# Patient Record
Sex: FEMALE | Race: WHITE | NOT HISPANIC OR LATINO | Employment: OTHER | URBAN - METROPOLITAN AREA
[De-identification: names, ages, dates, MRNs, and addresses within clinical notes are randomized per-mention and may not be internally consistent; named-entity substitution may affect disease eponyms.]

---

## 2017-09-01 ENCOUNTER — HOSPITAL ENCOUNTER (OUTPATIENT)
Dept: LAB | Facility: HOSPITAL | Age: 62
Discharge: HOME | End: 2017-09-01
Attending: OBSTETRICS & GYNECOLOGY | Admitting: OBSTETRICS & GYNECOLOGY

## 2017-09-05 ENCOUNTER — HOSPITAL ENCOUNTER (OUTPATIENT)
Dept: OTHER | Facility: HOSPITAL | Age: 62
Setting detail: SPECIMEN
Discharge: HOME | End: 2017-09-05
Attending: OBSTETRICS & GYNECOLOGY | Admitting: OBSTETRICS & GYNECOLOGY

## 2017-10-31 ENCOUNTER — HOSPITAL ENCOUNTER (OUTPATIENT)
Dept: LAB | Facility: HOSPITAL | Age: 62
Discharge: HOME | End: 2017-10-31
Attending: OBSTETRICS & GYNECOLOGY | Admitting: OBSTETRICS & GYNECOLOGY

## 2017-10-31 LAB
ABO + RH BLD: NORMAL
ARMBAND: NORMAL
BASOPHILS # BLD AUTO: 0 10*3/UL (ref 0–0.2)
BASOPHILS NFR BLD AUTO: 1 % (ref 0–2)
BLD COMPONENT TYPE: NORMAL
BLD GP AB SCN SERPL QL: NEGATIVE
CROSSMATCH EXPIRATION: NORMAL
DIFFERENTIAL METHOD BLD: (no result)
EOSINOPHIL # BLD AUTO: 0 10*3/UL (ref 0–0.3)
EOSINOPHIL # BLD AUTO: 1 % (ref 0–3)
ERYTHROCYTE [DISTWIDTH] IN BLOOD BY AUTOMATED COUNT: 13 % (ref 11.5–14.5)
HCT VFR BLD AUTO: 35.5 % (ref 35–49)
HGB BLD-MCNC: 11.9 G/DL (ref 12–15)
LYMPHOCYTES # BLD AUTO: 1.7 10*3/UL (ref 0.8–4.8)
LYMPHOCYTES NFR BLD AUTO: 45 % (ref 18–42)
MCH RBC QN AUTO: 30.6 PG (ref 26–32)
MCHC RBC AUTO-ENTMCNC: 33.6 G/DL (ref 32–36)
MCV RBC AUTO: 90.9 FL (ref 80–94)
MONOCYTES # BLD AUTO: 0.4 10*3/UL (ref 0.1–1.3)
MONOCYTES NFR BLD AUTO: 10 % (ref 2–11)
NEUTROPHILS # BLD AUTO: 1.6 10*3/UL (ref 2.3–8.6)
NEUTROPHILS NFR BLD AUTO: 43 % (ref 50–75)
NRBC BLD AUTO-RTO: 0 /100{WBCS}
NRBC/RBC NFR BLD MANUAL: 0 10*3/UL
PLATELET # BLD AUTO: 190 10*3/UL (ref 150–450)
PMV BLD AUTO: 9.5 FL (ref 7.4–10.4)
RBC # BLD AUTO: 3.9 10*6/UL (ref 4–5.4)
WBC # BLD AUTO: 3.8 10*3/UL (ref 4.5–11.5)

## 2017-11-02 ENCOUNTER — HOSPITAL ENCOUNTER (OUTPATIENT)
Dept: OTHER | Facility: HOSPITAL | Age: 62
Setting detail: SPECIMEN
Discharge: HOME | End: 2017-11-02
Attending: OBSTETRICS & GYNECOLOGY | Admitting: OBSTETRICS & GYNECOLOGY

## 2019-04-26 ENCOUNTER — HOSPITAL ENCOUNTER (OUTPATIENT)
Dept: FAMILY MEDICINE CLINIC | Facility: CLINIC | Age: 64
Setting detail: SPECIMEN
Discharge: HOME | End: 2019-04-26
Attending: FAMILY MEDICINE | Admitting: FAMILY MEDICINE

## 2019-04-26 LAB
ALBUMIN SERPL-MCNC: 3.6 G/DL (ref 3.5–4.8)
ALBUMIN/GLOB SERPL: 1.1 {RATIO} (ref 1–1.7)
ALP SERPL-CCNC: 61 IU/L (ref 32–91)
ALT SERPL-CCNC: 16 IU/L (ref 14–54)
ANION GAP SERPL CALC-SCNC: 13.6 MMOL/L (ref 10–20)
AST SERPL-CCNC: 25 IU/L (ref 15–41)
BASOPHILS # BLD AUTO: 0 10*3/UL (ref 0–0.2)
BASOPHILS NFR BLD AUTO: 0 % (ref 0–2)
BILIRUB SERPL-MCNC: 0.2 MG/DL (ref 0.3–1.2)
BUN SERPL-MCNC: 12 MG/DL (ref 8–20)
BUN/CREAT SERPL: 17.1 (ref 5.4–26.2)
CALCIUM SERPL-MCNC: 9 MG/DL (ref 8.9–10.3)
CHLORIDE SERPL-SCNC: 103 MMOL/L (ref 101–111)
CHOLEST SERPL-MCNC: 221 MG/DL
CHOLEST/HDLC SERPL: 2.9 {RATIO}
CONV CO2: 25 MMOL/L (ref 22–32)
CONV LDL CHOLESTEROL DIRECT: 130 MG/DL (ref 0–100)
CONV TOTAL PROTEIN: 6.9 G/DL (ref 6.1–7.9)
CREAT UR-MCNC: 0.7 MG/DL (ref 0.4–1)
DIFFERENTIAL METHOD BLD: (no result)
EOSINOPHIL # BLD AUTO: 0.1 10*3/UL (ref 0–0.3)
EOSINOPHIL # BLD AUTO: 1 % (ref 0–3)
ERYTHROCYTE [DISTWIDTH] IN BLOOD BY AUTOMATED COUNT: 13 % (ref 11.5–14.5)
GLOBULIN UR ELPH-MCNC: 3.3 G/DL (ref 2.5–3.8)
GLUCOSE SERPL-MCNC: 95 MG/DL (ref 65–99)
HCT VFR BLD AUTO: 40.8 % (ref 35–49)
HDLC SERPL-MCNC: 76 MG/DL
HGB BLD-MCNC: 13.6 G/DL (ref 12–15)
LDLC/HDLC SERPL: 1.7 {RATIO}
LIPID INTERPRETATION: ABNORMAL
LYMPHOCYTES # BLD AUTO: 1.6 10*3/UL (ref 0.8–4.8)
LYMPHOCYTES NFR BLD AUTO: 41 % (ref 18–42)
MCH RBC QN AUTO: 30.8 PG (ref 26–32)
MCHC RBC AUTO-ENTMCNC: 33.4 G/DL (ref 32–36)
MCV RBC AUTO: 92.2 FL (ref 80–94)
MONOCYTES # BLD AUTO: 0.3 10*3/UL (ref 0.1–1.3)
MONOCYTES NFR BLD AUTO: 8 % (ref 2–11)
NEUTROPHILS # BLD AUTO: 1.9 10*3/UL (ref 2.3–8.6)
NEUTROPHILS NFR BLD AUTO: 50 % (ref 50–75)
NRBC BLD AUTO-RTO: 0 /100{WBCS}
NRBC/RBC NFR BLD MANUAL: 0 10*3/UL
PLATELET # BLD AUTO: 217 10*3/UL (ref 150–450)
PMV BLD AUTO: 9.9 FL (ref 7.4–10.4)
POTASSIUM SERPL-SCNC: 4.6 MMOL/L (ref 3.6–5.1)
RBC # BLD AUTO: 4.43 10*6/UL (ref 4–5.4)
SODIUM SERPL-SCNC: 137 MMOL/L (ref 136–144)
TRIGL SERPL-MCNC: 127 MG/DL
VLDLC SERPL CALC-MCNC: 15.2 MG/DL
WBC # BLD AUTO: 3.8 10*3/UL (ref 4.5–11.5)

## 2019-06-25 ENCOUNTER — LAB (OUTPATIENT)
Dept: LAB | Facility: HOSPITAL | Age: 64
End: 2019-06-25

## 2019-06-25 ENCOUNTER — CONSULT (OUTPATIENT)
Dept: ONCOLOGY | Facility: CLINIC | Age: 64
End: 2019-06-25

## 2019-06-25 VITALS
HEART RATE: 69 BPM | BODY MASS INDEX: 22.63 KG/M2 | DIASTOLIC BLOOD PRESSURE: 63 MMHG | SYSTOLIC BLOOD PRESSURE: 129 MMHG | RESPIRATION RATE: 18 BRPM | WEIGHT: 140.8 LBS | TEMPERATURE: 97.8 F | HEIGHT: 66 IN

## 2019-06-25 DIAGNOSIS — D70.8 OTHER NEUTROPENIA (HCC): ICD-10-CM

## 2019-06-25 DIAGNOSIS — D70.8 OTHER NEUTROPENIA (HCC): Primary | ICD-10-CM

## 2019-06-25 LAB
FERRITIN SERPL-MCNC: 18 NG/ML (ref 11–307)
FOLATE SERPL-MCNC: 18.1 NG/ML (ref 5.9–24.8)
LDH SERPL-CCNC: 109 U/L (ref 98–192)
PATHOLOGY REVIEW: YES
RETICS # AUTO: 0.02 10*6/MM3 (ref 0.02–0.13)
RETICS/RBC NFR AUTO: 0.6 % (ref 0.7–1.9)
VIT B12 BLD-MCNC: 1201 PG/ML (ref 180–914)

## 2019-06-25 PROCEDURE — 83615 LACTATE (LD) (LDH) ENZYME: CPT | Performed by: INTERNAL MEDICINE

## 2019-06-25 PROCEDURE — 36415 COLL VENOUS BLD VENIPUNCTURE: CPT

## 2019-06-25 PROCEDURE — 82607 VITAMIN B-12: CPT | Performed by: INTERNAL MEDICINE

## 2019-06-25 PROCEDURE — G0463 HOSPITAL OUTPT CLINIC VISIT: HCPCS | Performed by: INTERNAL MEDICINE

## 2019-06-25 PROCEDURE — 83010 ASSAY OF HAPTOGLOBIN QUANT: CPT | Performed by: INTERNAL MEDICINE

## 2019-06-25 PROCEDURE — 85045 AUTOMATED RETICULOCYTE COUNT: CPT | Performed by: INTERNAL MEDICINE

## 2019-06-25 PROCEDURE — 99204 OFFICE O/P NEW MOD 45 MIN: CPT | Performed by: INTERNAL MEDICINE

## 2019-06-25 PROCEDURE — 82746 ASSAY OF FOLIC ACID SERUM: CPT | Performed by: INTERNAL MEDICINE

## 2019-06-25 PROCEDURE — 82728 ASSAY OF FERRITIN: CPT | Performed by: INTERNAL MEDICINE

## 2019-06-25 RX ORDER — CHOLECALCIFEROL (VITAMIN D3) 125 MCG
1 CAPSULE ORAL DAILY
COMMUNITY

## 2019-06-25 RX ORDER — CETIRIZINE HYDROCHLORIDE 10 MG/1
10 TABLET ORAL DAILY
COMMUNITY

## 2019-06-25 RX ORDER — DIPHENHYDRAMINE HCL 25 MG
12.5 TABLET ORAL NIGHTLY PRN
COMMUNITY
End: 2021-07-27

## 2019-06-25 NOTE — PROGRESS NOTES
Hematology/Oncology Outpatient Consultation    Kym Yin  1955    Primary Care Physician: Marcelle Martin DO  Referring Physician: Marcelle Martin DO  Reason For Consult:     Chief Complaint   Patient presents with   • Appointment     New patient with Leukopenia       History of Present Illness:    Past Medical History:   Diagnosis Date   • Arthritis    • Endometriosis    • Environmental allergies    • Hard of hearing        Past Surgical History:   Procedure Laterality Date   • BREAST BIOPSY      benign   • CATARACT EXTRACTION, BILATERAL     • CYST REMOVAL      Behind the knee   • HYSTERECTOMY  2017    Total due to endometriosis   • INGUINAL HERNIA REPAIR           Current Outpatient Medications:   •  cetirizine (zyrTEC) 10 MG tablet, Take 10 mg by mouth Daily., Disp: , Rfl:   •  Cholecalciferol (VITAMIN D3) 2000 units tablet, Take 1 tablet by mouth Daily., Disp: , Rfl:   •  diphenhydrAMINE (BENADRYL) 25 MG tablet, Take 12.5 mg by mouth At Night As Needed for Itching., Disp: , Rfl:   •  Unable to find, Take 1 each by mouth Daily. Triphala, Disp: , Rfl:   •  Unable to find, Take 1 each by mouth Daily. Pueraria Mirifica, Disp: , Rfl:     No Known Allergies      There is no immunization history on file for this patient.    Family History   Problem Relation Age of Onset   • Prostate cancer Father 62   • Breast cancer Sister 56   • Cancer Sister 62        Neck   • Breast cancer Maternal Grandmother 52       Cancer-related family history includes Breast cancer (age of onset: 52) in her maternal grandmother; Breast cancer (age of onset: 56) in her sister; Cancer (age of onset: 62) in her sister; Prostate cancer (age of onset: 62) in her father.    Social History     Tobacco Use   • Smoking status: Former Smoker     Packs/day: 1.00     Years: 12.00     Pack years: 12.00     Start date:      Last attempt to quit:      Years since quittin.5   • Smokeless tobacco: Never Used   Substance Use Topics  "  • Alcohol use: Yes     Comment: occasionally   • Drug use: No       ROS:    Review of Systems    Objective:    Vitals:    06/25/19 1026   BP: 129/63   Pulse: 69   Resp: 18   Temp: 97.8 °F (36.6 °C)   Weight: 63.9 kg (140 lb 12.8 oz)   Height: 166.4 cm (65.5\")   PainSc: 0-No pain       ECOG  {[facility] ECOG Status:70956}    Physical Exam:    Physical Exam    RECENT LABS  WBC   Date Value Ref Range Status   04/26/2019 3.8 (L) 4.5 - 11.5 10*3/uL Final     RBC   Date Value Ref Range Status   04/26/2019 4.43 4.00 - 5.40 10*6/uL Final     Hemoglobin   Date Value Ref Range Status   04/26/2019 13.6 12.0 - 15.0 g/dL Final     Hematocrit   Date Value Ref Range Status   04/26/2019 40.8 35 - 49 % Final     MCV   Date Value Ref Range Status   04/26/2019 92.2 80 - 94 fL Final     MCH   Date Value Ref Range Status   04/26/2019 30.8 26 - 32 pg Final     MCHC   Date Value Ref Range Status   04/26/2019 33.4 32 - 36 g/dL Final     RDW   Date Value Ref Range Status   04/26/2019 13.0 11.5 - 14.5 % Final     MPV   Date Value Ref Range Status   04/26/2019 9.9 7.4 - 10.4 fL Final     Platelets   Date Value Ref Range Status   04/26/2019 217 150 - 450 10*3/uL Final     Neutrophil Rel %   Date Value Ref Range Status   04/26/2019 50 50 - 75 % Final     Lymphocyte Rel %   Date Value Ref Range Status   04/26/2019 41 18 - 42 % Final     Monocyte Rel %   Date Value Ref Range Status   04/26/2019 8 2 - 11 % Final     Eosinophil Rel %   Date Value Ref Range Status   04/26/2019 1 0 - 3 % Final     Basophil Rel %   Date Value Ref Range Status   04/26/2019 0 0 - 2 % Final     Neutrophils Absolute   Date Value Ref Range Status   04/26/2019 1.9 (L) 2.3 - 8.6 10*3/uL Final     Lymphocytes Absolute   Date Value Ref Range Status   04/26/2019 1.6 0.8 - 4.8 10*3/uL Final     Monocytes Absolute   Date Value Ref Range Status   04/26/2019 0.3 0.1 - 1.3 10*3/uL Final     Eosinophils Absolute   Date Value Ref Range Status   04/26/2019 0.1 0.0 - 0.3 " 10*3/uL Final     Basophils Absolute   Date Value Ref Range Status   04/26/2019 0.0 0 - 0.2 10*3/uL Final     nRBC   Date Value Ref Range Status   04/26/2019 0 0 /100[WBCs] Final       Lab Results   Component Value Date    GLUCOSE 95 04/26/2019    BUN 12 04/26/2019    CREATININE 0.7 04/26/2019    BCR 17.1 04/26/2019    K 4.6 04/26/2019    CO2 25 04/26/2019    CALCIUM 9.0 04/26/2019    ALBUMIN 3.6 04/26/2019    LABIL2 1.1 04/26/2019    AST 25 04/26/2019    ALT 16 04/26/2019         Assessment/Plan     No diagnosis found.    I have reviewed labs results, imaging, vitals, and medications with the patient today.  Will follow up in *** months with ***.         Patient verbalized understanding and is in agreement of the above plan.              Part of this document was scribed by Araceli Adorno RN, BSN.

## 2019-06-25 NOTE — PROGRESS NOTES
Hematology/Oncology Outpatient Consultation    Kym Yin  1955    Primary Care Physician: Marcelle Martin DO  Referring Physician: Marcelle Martin DO  Reason For Consult:     Chief Complaint   Patient presents with   • Appointment     New patient with Leukopenia       History of Present Illness:  This is a 64-year-old female who is essentially healthy but has had a history of leukopenia for several years.  Patient also states that she was a strict vegan for many years but over the past 2 years has become a vegetarian.  She says that her white count range anywhere from 2.1-3.5.  Review of her CBCs for the past yea has shown a white count of 3.8, hemoglobin 13.6 and platelet count of 217,000 as of May 2019.  Her differentials with ANC of 1.9 there was no lymphocytosis, monocytosis, basophilia or eosinophilia.  Patient feels well overall she remains active.  She denies any new pain issues or night sweats.  She has chronic fatigue which has not changed over the years she usually will take a one hour nap every day.    Past Medical History:   Diagnosis Date   • Arthritis    • Endometriosis    • Environmental allergies    • Hard of hearing        Past Surgical History:   Procedure Laterality Date   • BREAST BIOPSY      benign   • CATARACT EXTRACTION, BILATERAL     • CYST REMOVAL      Behind the knee   • HYSTERECTOMY  2017    Total due to endometriosis   • INGUINAL HERNIA REPAIR           Current Outpatient Medications:   •  cetirizine (zyrTEC) 10 MG tablet, Take 10 mg by mouth Daily., Disp: , Rfl:   •  Cholecalciferol (VITAMIN D3) 2000 units tablet, Take 1 tablet by mouth Daily., Disp: , Rfl:   •  diphenhydrAMINE (BENADRYL) 25 MG tablet, Take 12.5 mg by mouth At Night As Needed for Itching., Disp: , Rfl:   •  Unable to find, Take 1 each by mouth Daily. Triphala, Disp: , Rfl:   •  Unable to find, Take 1 each by mouth Daily. Pueraria Mirifica, Disp: , Rfl:     No Known Allergies      There is no immunization  "history on file for this patient.    Family History   Problem Relation Age of Onset   • Prostate cancer Father 62   • Breast cancer Sister 56   • Cancer Sister 62        Neck   • Breast cancer Maternal Grandmother 52       Cancer-related family history includes Breast cancer (age of onset: 52) in her maternal grandmother; Breast cancer (age of onset: 56) in her sister; Cancer (age of onset: 62) in her sister; Prostate cancer (age of onset: 62) in her father.    Social History     Tobacco Use   • Smoking status: Former Smoker     Packs/day: 1.00     Years: 12.00     Pack years: 12.00     Start date:      Last attempt to quit:      Years since quittin.5   • Smokeless tobacco: Never Used   Substance Use Topics   • Alcohol use: Yes     Comment: occasionally   • Drug use: No       ROS:    Review of Systems   Constitutional: Positive for fatigue. Negative for chills and fever.   HENT: Negative for ear pain, mouth sores, nosebleeds and sore throat.    Eyes: Negative for photophobia and visual disturbance.   Respiratory: Negative for wheezing and stridor.    Cardiovascular: Negative for chest pain and palpitations.   Gastrointestinal: Negative for abdominal pain, diarrhea, nausea and vomiting.   Endocrine: Negative for cold intolerance and heat intolerance.   Genitourinary: Negative for dysuria and hematuria.   Musculoskeletal: Negative for joint swelling and neck stiffness.   Skin: Negative for color change and rash.   Neurological: Negative for seizures and syncope.   Hematological: Negative for adenopathy.        No obvious bleeding   Psychiatric/Behavioral: Negative for agitation, confusion and hallucinations.       Objective:    Vitals:    19 1026   BP: 129/63   Pulse: 69   Resp: 18   Temp: 97.8 °F (36.6 °C)   Weight: 63.9 kg (140 lb 12.8 oz)   Height: 166.4 cm (65.5\")   PainSc: 0-No pain       ECOG  (0) Fully active, able to carry on all predisease performance without restriction    Physical " Exam:    Physical Exam   Constitutional: She is oriented to person, place, and time. No distress.   HENT:   Head: Normocephalic and atraumatic.   Eyes: Conjunctivae and EOM are normal. Right eye exhibits no discharge. No scleral icterus.   Neck: Normal range of motion. Neck supple. No thyromegaly present.   Cardiovascular: Normal rate, regular rhythm and normal heart sounds. Exam reveals no gallop and no friction rub.   Pulmonary/Chest: Effort normal. No stridor. No respiratory distress. She has no wheezes.   Abdominal: Soft. Bowel sounds are normal. She exhibits no mass. There is no tenderness. There is no rebound and no guarding.   Musculoskeletal: Normal range of motion. She exhibits no tenderness.   Lymphadenopathy:     She has no cervical adenopathy.   Neurological: She is alert and oriented to person, place, and time. She exhibits normal muscle tone.   Skin: Skin is warm. Rash noted. She is not diaphoretic. No erythema.   Rash on bilateral forearms   Psychiatric: She has a normal mood and affect. Her behavior is normal.   Nursing note and vitals reviewed.      RECENT LABS  WBC   Date Value Ref Range Status   04/26/2019 3.8 (L) 4.5 - 11.5 10*3/uL Final     RBC   Date Value Ref Range Status   04/26/2019 4.43 4.00 - 5.40 10*6/uL Final     Hemoglobin   Date Value Ref Range Status   04/26/2019 13.6 12.0 - 15.0 g/dL Final     Hematocrit   Date Value Ref Range Status   04/26/2019 40.8 35 - 49 % Final     MCV   Date Value Ref Range Status   04/26/2019 92.2 80 - 94 fL Final     MCH   Date Value Ref Range Status   04/26/2019 30.8 26 - 32 pg Final     MCHC   Date Value Ref Range Status   04/26/2019 33.4 32 - 36 g/dL Final     RDW   Date Value Ref Range Status   04/26/2019 13.0 11.5 - 14.5 % Final     MPV   Date Value Ref Range Status   04/26/2019 9.9 7.4 - 10.4 fL Final     Platelets   Date Value Ref Range Status   04/26/2019 217 150 - 450 10*3/uL Final     Neutrophil Rel %   Date Value Ref Range Status   04/26/2019 50  50 - 75 % Final     Lymphocyte Rel %   Date Value Ref Range Status   04/26/2019 41 18 - 42 % Final     Monocyte Rel %   Date Value Ref Range Status   04/26/2019 8 2 - 11 % Final     Eosinophil Rel %   Date Value Ref Range Status   04/26/2019 1 0 - 3 % Final     Basophil Rel %   Date Value Ref Range Status   04/26/2019 0 0 - 2 % Final     Neutrophils Absolute   Date Value Ref Range Status   04/26/2019 1.9 (L) 2.3 - 8.6 10*3/uL Final     Lymphocytes Absolute   Date Value Ref Range Status   04/26/2019 1.6 0.8 - 4.8 10*3/uL Final     Monocytes Absolute   Date Value Ref Range Status   04/26/2019 0.3 0.1 - 1.3 10*3/uL Final     Eosinophils Absolute   Date Value Ref Range Status   04/26/2019 0.1 0.0 - 0.3 10*3/uL Final     Basophils Absolute   Date Value Ref Range Status   04/26/2019 0.0 0 - 0.2 10*3/uL Final     nRBC   Date Value Ref Range Status   04/26/2019 0 0 /100[WBCs] Final       Lab Results   Component Value Date    GLUCOSE 95 04/26/2019    BUN 12 04/26/2019    CREATININE 0.7 04/26/2019    BCR 17.1 04/26/2019    K 4.6 04/26/2019    CO2 25 04/26/2019    CALCIUM 9.0 04/26/2019    ALBUMIN 3.6 04/26/2019    LABIL2 1.1 04/26/2019    AST 25 04/26/2019    ALT 16 04/26/2019         Assessment/Plan       1. Leukopenia  2. Family history of cancer patient has been given information on cancer genetics.  She will let me know how she wants to proceed in the future.      Plans  She has chronic leukopenia of unclear etiology. Possibilities include autoimmune disease, nutritional deficiencies, gammopathyor a primary bone marrow disease.  Her history and physical exam appears benign.  Labs have been ordered to further evaluate the above differential diagnosis including a peripheral smear    I will plan to see her back in 2 weeks to review the results of any further recommendations.  Also included in her lab is anti-neutrophil antibody test.      I have reviewed labs results, imaging, vitals, and medications with the patient today.         Patient verbalized understanding and is in agreement of the above plan. I have discussed the differential diagnosis with her.    Thank you very much for allowing me participate in the care of Ms. Yin. I will keep you updated on her progress.          Part of this document was scribed by Araceli Adorno RN, BSN.

## 2019-06-26 LAB
ALBUMIN SERPL-MCNC: 3.4 G/DL (ref 2.9–4.4)
ALBUMIN/GLOB SERPL: 1.1 {RATIO} (ref 0.7–1.7)
ALPHA1 GLOB FLD ELPH-MCNC: 0.3 G/DL (ref 0–0.4)
ALPHA2 GLOB SERPL ELPH-MCNC: 0.7 G/DL (ref 0.4–1)
ANA SER QL: NEGATIVE
B-GLOBULIN SERPL ELPH-MCNC: 1.1 G/DL (ref 0.7–1.3)
GAMMA GLOB SERPL ELPH-MCNC: 1.2 G/DL (ref 0.4–1.8)
GLOBULIN SER CALC-MCNC: 3.3 G/DL (ref 2.2–3.9)
HAPTOGLOB SERPL-MCNC: 94 MG/DL (ref 36–195)
IGA SERPL-MCNC: 180 MG/DL (ref 87–352)
IGG SERPL-MCNC: 1096 MG/DL (ref 700–1600)
IGM SERPL-MCNC: 99 MG/DL (ref 26–217)
INTERPRETATION SERPL IEP-IMP: NORMAL
Lab: NORMAL
M-SPIKE: NORMAL G/DL
PROT SERPL-MCNC: 6.7 G/DL (ref 6–8.5)

## 2019-06-27 LAB
LAB AP CASE REPORT: NORMAL
Lab: NORMAL
METHYLMALONATE SERPL-SCNC: 130 NMOL/L (ref 0–378)
PATH REPORT.FINAL DX SPEC: NORMAL

## 2019-07-09 ENCOUNTER — OFFICE VISIT (OUTPATIENT)
Dept: ONCOLOGY | Facility: CLINIC | Age: 64
End: 2019-07-09

## 2019-07-09 ENCOUNTER — APPOINTMENT (OUTPATIENT)
Dept: LAB | Facility: HOSPITAL | Age: 64
End: 2019-07-09

## 2019-07-09 VITALS
HEIGHT: 66 IN | RESPIRATION RATE: 18 BRPM | SYSTOLIC BLOOD PRESSURE: 105 MMHG | BODY MASS INDEX: 22.92 KG/M2 | WEIGHT: 142.6 LBS | TEMPERATURE: 97.4 F | DIASTOLIC BLOOD PRESSURE: 64 MMHG | HEART RATE: 73 BPM

## 2019-07-09 DIAGNOSIS — D70.8 OTHER NEUTROPENIA (HCC): Primary | ICD-10-CM

## 2019-07-09 LAB
BASOPHILS # BLD AUTO: 0.02 10*3/MM3 (ref 0–0.2)
BASOPHILS NFR BLD AUTO: 0.4 % (ref 0–1.5)
BILIRUB UR QL STRIP: ABNORMAL
CLARITY UR: CLEAR
COLOR UR: YELLOW
DEPRECATED RDW RBC AUTO: 38.1 FL (ref 37–54)
EOSINOPHIL # BLD AUTO: 0.02 10*3/MM3 (ref 0–0.4)
EOSINOPHIL NFR BLD AUTO: 0.4 % (ref 0.3–6.2)
ERYTHROCYTE [DISTWIDTH] IN BLOOD BY AUTOMATED COUNT: 11.9 % (ref 12.3–15.4)
GLUCOSE UR STRIP-MCNC: NEGATIVE MG/DL
HCT VFR BLD AUTO: 35.5 % (ref 34–46.6)
HGB BLD-MCNC: 12.1 G/DL (ref 12–15.9)
HGB UR QL STRIP.AUTO: NEGATIVE
KETONES UR QL STRIP: ABNORMAL
LEUKOCYTE ESTERASE UR QL STRIP.AUTO: NEGATIVE
LYMPHOCYTES # BLD AUTO: 1.57 10*3/MM3 (ref 0.7–3.1)
LYMPHOCYTES NFR BLD AUTO: 34.3 % (ref 19.6–45.3)
MCH RBC QN AUTO: 31.5 PG (ref 26.6–33)
MCHC RBC AUTO-ENTMCNC: 34.1 G/DL (ref 31.5–35.7)
MCV RBC AUTO: 92.4 FL (ref 79–97)
MONOCYTES # BLD AUTO: 0.35 10*3/MM3 (ref 0.1–0.9)
MONOCYTES NFR BLD AUTO: 7.6 % (ref 5–12)
NEUTROPHILS # BLD AUTO: 2.62 10*3/MM3 (ref 1.7–7)
NEUTROPHILS NFR BLD AUTO: 57.3 % (ref 42.7–76)
NITRITE UR QL STRIP: NEGATIVE
PH UR STRIP.AUTO: 5.5 [PH] (ref 5–8)
PLATELET # BLD AUTO: 192 10*3/MM3 (ref 140–450)
PMV BLD AUTO: 10.8 FL (ref 6–12)
PROT UR QL STRIP: NEGATIVE
RBC # BLD AUTO: 3.84 10*6/MM3 (ref 3.77–5.28)
SP GR UR STRIP: >=1.03 (ref 1–1.03)
UROBILINOGEN UR QL STRIP: ABNORMAL
WBC NRBC COR # BLD: 4.58 10*3/MM3 (ref 3.4–10.8)

## 2019-07-09 PROCEDURE — 85025 COMPLETE CBC W/AUTO DIFF WBC: CPT | Performed by: INTERNAL MEDICINE

## 2019-07-09 PROCEDURE — 99214 OFFICE O/P EST MOD 30 MIN: CPT | Performed by: INTERNAL MEDICINE

## 2019-07-09 PROCEDURE — 81003 URINALYSIS AUTO W/O SCOPE: CPT | Performed by: INTERNAL MEDICINE

## 2019-07-09 NOTE — PROGRESS NOTES
Hematology/Oncology Outpatient Follow Up    Patient name:Kym Yin  :1955  MRN: [id number]  Primary Care Physician: Marcelle Martin DO  Referring Physician: Marcelle Martin DO    Chief Complaint   Patient presents with   • Follow-up     leucopenia   • Follow-up     iron deficiency        History of Present Illness:     This is a 64-year-old female who is essentially healthy but has had a history of leukopenia for several years.  Patient also states that she was a strict vegan for many years but over the past 2 years has become a vegetarian.  She says that her white count range anywhere from 2.1-3.5.  Review of her CBCs for the past yea has shown a white count of 3.8, hemoglobin 13.6 and platelet count of 217,000 as of May 2019.  Her differentials with ANC of 1.9 there was no lymphocytosis, monocytosis, basophilia or eosinophilia.  Patient feels well overall she remains active.  She denies any new pain issues or night sweats.  She has chronic fatigue which has not changed over the years she usually will take a one hour nap every day.  · 2019 she had SPEP with DK which was negative.  Ferritin level was low at 18.  Folate was normal at 18.  Methylmalonic acid was normal at 130 B12 level was 1201.  Haptoglobin was normal at 94. SPEP with immunofixation assay was negative, reticulocyte count was 0.6.  Quantitative immunoglobulins for IgA was normal at 180, IgG was 1096 and IgM was 99 normal.  ЕЛЕНА was negative.  Peripheral smear did not show any morphologic abnormalities and her WBC is appeared adequate with normal differential and platelets were also adequate.      Past Medical History:   Diagnosis Date   • Arthritis    • Endometriosis    • Environmental allergies    • Hard of hearing        Past Surgical History:   Procedure Laterality Date   • BREAST BIOPSY      benign   • CATARACT EXTRACTION, BILATERAL     • CYST REMOVAL      Behind the knee   • HYSTERECTOMY  2017    Total due to dysfuntional  uterine bleeding   • INGUINAL HERNIA REPAIR           Current Outpatient Medications:   •  cetirizine (zyrTEC) 10 MG tablet, Take 10 mg by mouth Daily., Disp: , Rfl:   •  Cholecalciferol (VITAMIN D3) 2000 units tablet, Take 1 tablet by mouth Daily., Disp: , Rfl:   •  diphenhydrAMINE (BENADRYL) 25 MG tablet, Take 12.5 mg by mouth At Night As Needed for Itching., Disp: , Rfl:   •  Unable to find, Take 1 each by mouth Daily. Triphala, Disp: , Rfl:   •  Unable to find, Take 1 each by mouth Daily. Pueraria Mirifica, Disp: , Rfl:     Allergies   Allergen Reactions   • Latex Rash       Family History   Problem Relation Age of Onset   • Prostate cancer Father 62   • Breast cancer Sister 56   • Cancer Sister 62        Neck   • Breast cancer Maternal Grandmother 52   • Cancer Maternal Grandfather    • Breast cancer Paternal Cousin 40       Cancer-related family history includes Breast cancer (age of onset: 40) in her paternal cousin; Breast cancer (age of onset: 52) in her maternal grandmother; Breast cancer (age of onset: 56) in her sister; Cancer in her maternal grandfather; Cancer (age of onset: 62) in her sister; Prostate cancer (age of onset: 62) in her father.    Social History     Tobacco Use   • Smoking status: Former Smoker     Packs/day: 1.00     Years: 12.00     Pack years: 12.00     Start date:      Last attempt to quit:      Years since quittin.5   • Smokeless tobacco: Never Used   Substance Use Topics   • Alcohol use: Yes     Comment: occasionally   • Drug use: No       I have reviewed the history of present illness, past medical history, family history, social history, lab results, all notes and other records since the patient was last seen on 2019    SUBJECTIVE:  The patient is here for follow up.  He has no specific complaints.  She remains very active.  She denies any fevers, chills, rigors.  She is accompanied today by her spouse for this appointment.          ROS:  Review of Systems  "  Constitutional: Negative for chills and fever.   HENT: Negative for ear pain, mouth sores, nosebleeds and sore throat.    Eyes: Negative for photophobia and visual disturbance.   Respiratory: Negative for wheezing and stridor.    Cardiovascular: Negative for chest pain and palpitations.   Gastrointestinal: Negative for abdominal pain, diarrhea, nausea and vomiting.   Endocrine: Negative for cold intolerance and heat intolerance.   Genitourinary: Negative for dysuria and hematuria.   Musculoskeletal: Negative for joint swelling and neck stiffness.   Skin: Negative for color change and rash.   Neurological: Negative for seizures and syncope.   Hematological: Negative for adenopathy.        No obvious bleeding   Psychiatric/Behavioral: Negative for agitation, confusion and hallucinations.       Objective:    Vitals:    07/09/19 1356   BP: 105/64   Pulse: 73   Resp: 18   Temp: 97.4 °F (36.3 °C)   Weight: 64.7 kg (142 lb 9.6 oz)   Height: 166.4 cm (65.5\")   PainSc: 0-No pain       ECOG  (0) Fully active, able to carry on all predisease performance without restriction    Physical Exam   Constitutional: She is oriented to person, place, and time. No distress.   HENT:   Head: Normocephalic and atraumatic.   Eyes: Conjunctivae and EOM are normal. Right eye exhibits no discharge. Left eye exhibits no discharge. No scleral icterus.   Neck: Normal range of motion. Neck supple. No thyromegaly present.   Cardiovascular: Normal rate, regular rhythm and normal heart sounds. Exam reveals no gallop and no friction rub.   Pulmonary/Chest: Effort normal. No stridor. No respiratory distress. She has no wheezes.   Abdominal: Soft. Bowel sounds are normal. She exhibits no mass. There is no tenderness. There is no rebound and no guarding.   Musculoskeletal: Normal range of motion. She exhibits no tenderness.   Lymphadenopathy:     She has no cervical adenopathy.   Neurological: She is alert and oriented to person, place, and time. She " exhibits normal muscle tone.   Skin: Skin is warm. No rash noted. She is not diaphoretic. No erythema.   Psychiatric: She has a normal mood and affect. Her behavior is normal.   Nursing note and vitals reviewed.      RECENT LABS  WBC   Date Value Ref Range Status   07/09/2019 4.58 3.40 - 10.80 10*3/mm3 Final     RBC   Date Value Ref Range Status   07/09/2019 3.84 3.77 - 5.28 10*6/mm3 Final     Hemoglobin   Date Value Ref Range Status   07/09/2019 12.1 12.0 - 15.9 g/dL Final     Hematocrit   Date Value Ref Range Status   07/09/2019 35.5 34.0 - 46.6 % Final     MCV   Date Value Ref Range Status   07/09/2019 92.4 79.0 - 97.0 fL Final     MCH   Date Value Ref Range Status   07/09/2019 31.5 26.6 - 33.0 pg Final     MCHC   Date Value Ref Range Status   07/09/2019 34.1 31.5 - 35.7 g/dL Final     RDW   Date Value Ref Range Status   07/09/2019 11.9 (L) 12.3 - 15.4 % Final     RDW-SD   Date Value Ref Range Status   07/09/2019 38.1 37.0 - 54.0 fl Final     MPV   Date Value Ref Range Status   07/09/2019 10.8 6.0 - 12.0 fL Final     Platelets   Date Value Ref Range Status   07/09/2019 192 140 - 450 10*3/mm3 Final     Neutrophil %   Date Value Ref Range Status   07/09/2019 57.3 42.7 - 76.0 % Final     Lymphocyte %   Date Value Ref Range Status   07/09/2019 34.3 19.6 - 45.3 % Final     Monocyte %   Date Value Ref Range Status   07/09/2019 7.6 5.0 - 12.0 % Final     Eosinophil %   Date Value Ref Range Status   07/09/2019 0.4 0.3 - 6.2 % Final     Basophil %   Date Value Ref Range Status   07/09/2019 0.4 0.0 - 1.5 % Final     Neutrophils, Absolute   Date Value Ref Range Status   07/09/2019 2.62 1.70 - 7.00 10*3/mm3 Final     Lymphocytes, Absolute   Date Value Ref Range Status   07/09/2019 1.57 0.70 - 3.10 10*3/mm3 Final     Monocytes, Absolute   Date Value Ref Range Status   07/09/2019 0.35 0.10 - 0.90 10*3/mm3 Final     Eosinophils, Absolute   Date Value Ref Range Status   07/09/2019 0.02 0.00 - 0.40 10*3/mm3 Final     Basophils,  Absolute   Date Value Ref Range Status   07/09/2019 0.02 0.00 - 0.20 10*3/mm3 Final     nRBC   Date Value Ref Range Status   04/26/2019 0 0 /100[WBCs] Final       Lab Results   Component Value Date    GLUCOSE 95 04/26/2019    BUN 12 04/26/2019    CREATININE 0.7 04/26/2019    BCR 17.1 04/26/2019    K 4.6 04/26/2019    CO2 25 04/26/2019    CALCIUM 9.0 04/26/2019    PROTENTOTREF 6.7 06/25/2019    ALBUMIN 3.4 06/25/2019    LABIL2 1.1 06/25/2019    AST 25 04/26/2019    ALT 16 04/26/2019         Assessment/Plan     Other neutropenia (CMS/HCC)  - CBC & Differential  - CBC Auto Differential  - Urinalysis With Culture If Indicated - Urine, Clean Catch      ASSESSMENT:    1. Leukopenia with negative work-up  2. Iron deficiency without anemia  3. Family history of cancer patient has been given information on cancer genetics.  She will let me know how she wants to proceed in the future.  4. Needs colonoscopy.  Patient will schedule with her primary care physician       PLAN:     She has chronic leukopenia of unclear etiology.  Work-up has been negative.  Also of note is that her leukopenia has resolved.  Her peripheral smear did not reveal any leukoerythroblastic changes to suggest primary bone marrow disease.  She has slightly reduced reticulocyte counts suggesting some bone marrow suppression but no overt cytologic abnormalities.  Therefore would observe for now.  Patient has iron deficiency without anemia.  She has never had a colonoscopy in the past due to problems with co-pay.  She is now willing to follow-up with GI. Her history and physical exam is benign.    I have reviewed labs results, imaging, vitals, and medications with the patient today. Will follow up in 4 months with me.  She will follow-up with her physician for scheduling of her colonoscopy  Her urinalysis today does not show hematuria  CBC will be ordered at the next visit     .     Patient verbalized understanding and is in agreement of the above plan.        Part of this document was scribed by Araceli Adorno RN, BSN.        Much of the above report is an electronic transcription//translation of the spoken language to printed text using Dragon Software. As such, the subtleties and finesse of the spoken language may permit erroneous, or at times, nonsensical words or phrases to be inadvertently transcribed; thus changes may be made at a later date to rectify these errors.

## 2019-07-09 NOTE — PROGRESS NOTES
Hematology/Oncology Outpatient Follow Up    Patient name:Kym Yin  :1955  MRN: [id number]  Primary Care Physician: Marcelle Martin DO  Referring Physician: Marcelle Martin DO    No chief complaint on file.       History of Present Illness:     Past Medical History:   Diagnosis Date   • Arthritis    • Endometriosis    • Environmental allergies    • Hard of hearing        Past Surgical History:   Procedure Laterality Date   • BREAST BIOPSY      benign   • CATARACT EXTRACTION, BILATERAL     • CYST REMOVAL      Behind the knee   • HYSTERECTOMY  2017    Total due to dysfuntional uterine bleeding   • INGUINAL HERNIA REPAIR           Current Outpatient Medications:   •  cetirizine (zyrTEC) 10 MG tablet, Take 10 mg by mouth Daily., Disp: , Rfl:   •  Cholecalciferol (VITAMIN D3) 2000 units tablet, Take 1 tablet by mouth Daily., Disp: , Rfl:   •  diphenhydrAMINE (BENADRYL) 25 MG tablet, Take 12.5 mg by mouth At Night As Needed for Itching., Disp: , Rfl:   •  Unable to find, Take 1 each by mouth Daily. Triphala, Disp: , Rfl:   •  Unable to find, Take 1 each by mouth Daily. Pueraria Mirifica, Disp: , Rfl:     No Known Allergies    Family History   Problem Relation Age of Onset   • Prostate cancer Father 62   • Breast cancer Sister 56   • Cancer Sister 62        Neck   • Breast cancer Maternal Grandmother 52   • Cancer Maternal Grandfather    • Breast cancer Paternal Cousin 40       Cancer-related family history includes Breast cancer (age of onset: 40) in her paternal cousin; Breast cancer (age of onset: 52) in her maternal grandmother; Breast cancer (age of onset: 56) in her sister; Cancer in her maternal grandfather; Cancer (age of onset: 62) in her sister; Prostate cancer (age of onset: 62) in her father.    Social History     Tobacco Use   • Smoking status: Former Smoker     Packs/day: 1.00     Years: 12.00     Pack years: 12.00     Start date:      Last attempt to quit:      Years since  quittin.5   • Smokeless tobacco: Never Used   Substance Use Topics   • Alcohol use: Yes     Comment: occasionally   • Drug use: No       I have reviewed the history of present illness, past medical history, family history, social history, lab results, all notes and other records since the patient was last seen on 2019    SUBJECTIVE:            ROS:  Review of Systems    Objective:    There were no vitals filed for this visit.    ECOG  {[facility] ECOG Status:37155}    Physical Exam    RECENT LABS  WBC   Date Value Ref Range Status   2019 3.8 (L) 4.5 - 11.5 10*3/uL Final     RBC   Date Value Ref Range Status   2019 4.43 4.00 - 5.40 10*6/uL Final     Hemoglobin   Date Value Ref Range Status   2019 13.6 12.0 - 15.0 g/dL Final     Hematocrit   Date Value Ref Range Status   2019 40.8 35 - 49 % Final     MCV   Date Value Ref Range Status   2019 92.2 80 - 94 fL Final     MCH   Date Value Ref Range Status   2019 30.8 26 - 32 pg Final     MCHC   Date Value Ref Range Status   2019 33.4 32 - 36 g/dL Final     RDW   Date Value Ref Range Status   2019 13.0 11.5 - 14.5 % Final     MPV   Date Value Ref Range Status   2019 9.9 7.4 - 10.4 fL Final     Platelets   Date Value Ref Range Status   2019 217 150 - 450 10*3/uL Final     Neutrophil Rel %   Date Value Ref Range Status   2019 50 50 - 75 % Final     Lymphocyte Rel %   Date Value Ref Range Status   2019 41 18 - 42 % Final     Monocyte Rel %   Date Value Ref Range Status   2019 8 2 - 11 % Final     Eosinophil Rel %   Date Value Ref Range Status   2019 1 0 - 3 % Final     Basophil Rel %   Date Value Ref Range Status   2019 0 0 - 2 % Final     Neutrophils Absolute   Date Value Ref Range Status   2019 1.9 (L) 2.3 - 8.6 10*3/uL Final     Lymphocytes Absolute   Date Value Ref Range Status   2019 1.6 0.8 - 4.8 10*3/uL Final     Monocytes Absolute   Date Value Ref  Range Status   04/26/2019 0.3 0.1 - 1.3 10*3/uL Final     Eosinophils Absolute   Date Value Ref Range Status   04/26/2019 0.1 0.0 - 0.3 10*3/uL Final     Basophils Absolute   Date Value Ref Range Status   04/26/2019 0.0 0 - 0.2 10*3/uL Final     nRBC   Date Value Ref Range Status   04/26/2019 0 0 /100[WBCs] Final       Lab Results   Component Value Date    GLUCOSE 95 04/26/2019    BUN 12 04/26/2019    CREATININE 0.7 04/26/2019    BCR 17.1 04/26/2019    K 4.6 04/26/2019    CO2 25 04/26/2019    CALCIUM 9.0 04/26/2019    PROTENTOTREF 6.7 06/25/2019    ALBUMIN 3.4 06/25/2019    LABIL2 1.1 06/25/2019    AST 25 04/26/2019    ALT 16 04/26/2019         Assessment/Plan     There are no diagnoses linked to this encounter.    ASSESSMENT:    1.     PLAN:     1.     I have reviewed labs results, imaging, vitals, and medications with the patient today. Will follow up in *** months with ***.     I counselled Kym of the risks of continuing to use tobacco and cessation.    During this visit, I spent 3-10 minutes counseling the patient regarding tobacco cessation.     Patient verbalized understanding and is in agreement of the above plan.           Much of the above report is an electronic transcription//translation of the spoken language to printed text using Dragon Software. As such, the subtleties and finesse of the spoken language may permit erroneous, or at times, nonsensical words or phrases to be inadvertently transcribed; thus changes may be made at a later date to rectify these errors.

## 2019-09-04 LAB — REF LAB TEST METHOD: NORMAL

## 2019-11-05 ENCOUNTER — APPOINTMENT (OUTPATIENT)
Dept: LAB | Facility: HOSPITAL | Age: 64
End: 2019-11-05

## 2019-11-05 ENCOUNTER — OFFICE VISIT (OUTPATIENT)
Dept: ONCOLOGY | Facility: CLINIC | Age: 64
End: 2019-11-05

## 2019-11-05 VITALS
BODY MASS INDEX: 23.5 KG/M2 | SYSTOLIC BLOOD PRESSURE: 115 MMHG | HEART RATE: 61 BPM | TEMPERATURE: 97.5 F | WEIGHT: 146.2 LBS | DIASTOLIC BLOOD PRESSURE: 70 MMHG | HEIGHT: 66 IN | RESPIRATION RATE: 18 BRPM

## 2019-11-05 DIAGNOSIS — E61.1 IRON DEFICIENCY: ICD-10-CM

## 2019-11-05 DIAGNOSIS — D72.819 LEUKOPENIA, UNSPECIFIED TYPE: Primary | ICD-10-CM

## 2019-11-05 LAB
BASOPHILS # BLD AUTO: 0.02 10*3/MM3 (ref 0–0.2)
BASOPHILS NFR BLD AUTO: 0.4 % (ref 0–1.5)
DEPRECATED RDW RBC AUTO: 38.8 FL (ref 37–54)
EOSINOPHIL # BLD AUTO: 0.05 10*3/MM3 (ref 0–0.4)
EOSINOPHIL NFR BLD AUTO: 1.1 % (ref 0.3–6.2)
ERYTHROCYTE [DISTWIDTH] IN BLOOD BY AUTOMATED COUNT: 11.9 % (ref 12.3–15.4)
FERRITIN SERPL-MCNC: 40.7 NG/ML (ref 13–150)
HCT VFR BLD AUTO: 37.3 % (ref 34–46.6)
HGB BLD-MCNC: 12.6 G/DL (ref 12–15.9)
IRON 24H UR-MRATE: 90 MCG/DL (ref 37–145)
IRON SATN MFR SERPL: 24 % (ref 20–50)
LYMPHOCYTES # BLD AUTO: 1.78 10*3/MM3 (ref 0.7–3.1)
LYMPHOCYTES NFR BLD AUTO: 38.2 % (ref 19.6–45.3)
MCH RBC QN AUTO: 30.7 PG (ref 26.6–33)
MCHC RBC AUTO-ENTMCNC: 33.8 G/DL (ref 31.5–35.7)
MCV RBC AUTO: 91 FL (ref 79–97)
MONOCYTES # BLD AUTO: 0.37 10*3/MM3 (ref 0.1–0.9)
MONOCYTES NFR BLD AUTO: 7.9 % (ref 5–12)
NEUTROPHILS # BLD AUTO: 2.44 10*3/MM3 (ref 1.7–7)
NEUTROPHILS NFR BLD AUTO: 52.4 % (ref 42.7–76)
PLATELET # BLD AUTO: 209 10*3/MM3 (ref 140–450)
PMV BLD AUTO: 10.3 FL (ref 6–12)
RBC # BLD AUTO: 4.1 10*6/MM3 (ref 3.77–5.28)
TIBC SERPL-MCNC: 370 MCG/DL (ref 298–536)
TRANSFERRIN SERPL-MCNC: 248 MG/DL (ref 200–360)
WBC NRBC COR # BLD: 4.66 10*3/MM3 (ref 3.4–10.8)

## 2019-11-05 PROCEDURE — 82728 ASSAY OF FERRITIN: CPT | Performed by: INTERNAL MEDICINE

## 2019-11-05 PROCEDURE — 99214 OFFICE O/P EST MOD 30 MIN: CPT | Performed by: INTERNAL MEDICINE

## 2019-11-05 PROCEDURE — 85025 COMPLETE CBC W/AUTO DIFF WBC: CPT | Performed by: INTERNAL MEDICINE

## 2019-11-05 PROCEDURE — 84466 ASSAY OF TRANSFERRIN: CPT | Performed by: INTERNAL MEDICINE

## 2019-11-05 PROCEDURE — 83540 ASSAY OF IRON: CPT | Performed by: INTERNAL MEDICINE

## 2019-11-05 RX ORDER — INFLUENZA VIRUS VACCINE 15; 15; 15; 15 UG/.5ML; UG/.5ML; UG/.5ML; UG/.5ML
SUSPENSION INTRAMUSCULAR
Refills: 0 | COMMUNITY
Start: 2019-09-25 | End: 2020-06-19

## 2019-11-05 RX ORDER — ZOSTER VACCINE RECOMBINANT, ADJUVANTED 50 MCG/0.5
KIT INTRAMUSCULAR
Refills: 1 | COMMUNITY
Start: 2019-09-25 | End: 2020-06-19

## 2019-11-05 NOTE — PROGRESS NOTES
Hematology/Oncology Outpatient Follow Up    Patient name:Kym Yin  :1955  MRN: [id number]  Primary Care Physician: Marcelle Martin DO  Referring Physician: Marcelle Martin DO    Chief Complaint   Patient presents with   • Follow-up     Iron deficiency without anemia        History of Present Illness:     This is a 64-year-old female who is essentially healthy but has had a history of leukopenia for several years.  Patient also states that she was a strict vegan for many years but over the past 2 years has become a vegetarian.  She says that her white count range anywhere from 2.1-3.5.  Review of her CBCs for the past yea has shown a white count of 3.8, hemoglobin 13.6 and platelet count of 217,000 as of May 2019.  Her differentials with ANC of 1.9 there was no lymphocytosis, monocytosis, basophilia or eosinophilia.  Patient feels well overall she remains active.  She denies any new pain issues or night sweats.  She has chronic fatigue which has not changed over the years she usually will take a one hour nap every day.  · 2019 she had SPEP with DK which was negative.  Ferritin level was low at 18.  Folate was normal at 18.  Methylmalonic acid was normal at 130 B12 level was 1201.  Haptoglobin was normal at 94. SPEP with immunofixation assay was negative, reticulocyte count was 0.6.  Quantitative immunoglobulins for IgA was normal at 180, IgG was 1096 and IgM was 99 normal.  ЕЛЕНА was negative.  Peripheral smear did not show any morphologic abnormalities and her WBC  appeared adequate with normal differentials and platelets were also adequate.      Past Medical History:   Diagnosis Date   • Arthritis    • Endometriosis    • Environmental allergies    • Hard of hearing        Past Surgical History:   Procedure Laterality Date   • BREAST BIOPSY      benign   • CATARACT EXTRACTION, BILATERAL     • CYST REMOVAL      Behind the knee   • HYSTERECTOMY  2017    Total due to dysfuntional uterine  bleeding   • INGUINAL HERNIA REPAIR           Current Outpatient Medications:   •  cetirizine (zyrTEC) 10 MG tablet, Take 10 mg by mouth Daily., Disp: , Rfl:   •  Cholecalciferol (VITAMIN D3) 2000 units tablet, Take 1 tablet by mouth Daily., Disp: , Rfl:   •  diphenhydrAMINE (BENADRYL) 25 MG tablet, Take 12.5 mg by mouth At Night As Needed for Itching., Disp: , Rfl:   •  FLUARIX QUADRIVALENT 0.5 ML suspension prefilled syringe injection, INJECT ONE INFLUENZA VACCINE PER PROTOCOL, Disp: , Rfl: 0  •  SHINGRIX 50 MCG/0.5ML reconstituted suspension, ADMINISTER VACCINE PER PHYSICIAN PROTOCOL, Disp: , Rfl: 1  •  Unable to find, Take 1 each by mouth Daily. Triphala, Disp: , Rfl:   •  Unable to find, Take 1 each by mouth Daily. Pueraria Mirifica, Disp: , Rfl:     Allergies   Allergen Reactions   • Latex Rash       Family History   Problem Relation Age of Onset   • Prostate cancer Father 62   • Breast cancer Sister 56   • Cancer Sister 62        Neck   • Breast cancer Maternal Grandmother 52   • Cancer Maternal Grandfather    • Breast cancer Paternal Cousin 40       Cancer-related family history includes Breast cancer (age of onset: 40) in her paternal cousin; Breast cancer (age of onset: 52) in her maternal grandmother; Breast cancer (age of onset: 56) in her sister; Cancer in her maternal grandfather; Cancer (age of onset: 62) in her sister; Prostate cancer (age of onset: 62) in her father.    Social History     Tobacco Use   • Smoking status: Former Smoker     Packs/day: 1.00     Years: 12.00     Pack years: 12.00     Start date:      Last attempt to quit:      Years since quittin.8   • Smokeless tobacco: Never Used   Substance Use Topics   • Alcohol use: Yes     Comment: occasionally   • Drug use: No       I have reviewed the history of present illness, past medical history, family history, social history, lab results, all notes and other records since the patient was last seen on  "6/25/2019    SUBJECTIVE:    The patient is here for follow up.  She has complaints of insomnia.  She states that she is able to stay asleep when she falls asleep but she has a hard time falling asleep.  The patient has no other complaints.  She states that her energy has improved since starting a physical education program.          ROS:  Review of Systems   Constitutional: Negative for chills and fever.   HENT: Negative for ear pain, mouth sores, nosebleeds and sore throat.    Eyes: Negative for photophobia and visual disturbance.   Respiratory: Negative for wheezing and stridor.    Cardiovascular: Negative for chest pain and palpitations.   Gastrointestinal: Negative for abdominal pain, diarrhea, nausea and vomiting.   Endocrine: Negative for cold intolerance and heat intolerance.   Genitourinary: Negative for dysuria and hematuria.   Musculoskeletal: Negative for joint swelling and neck stiffness.   Skin: Negative for color change and rash.   Neurological: Negative for seizures and syncope.   Hematological: Negative for adenopathy.        No obvious bleeding   Psychiatric/Behavioral: Negative for agitation, confusion and hallucinations.       Objective:    Vitals:    11/05/19 1133   BP: 115/70   Pulse: 61   Resp: 18   Temp: 97.5 °F (36.4 °C)   Weight: 66.3 kg (146 lb 3.2 oz)   Height: 166.4 cm (65.5\")   PainSc:   3   PainLoc: Knee  Comment: right knee pain from fall       ECOG  (0) Fully active, able to carry on all predisease performance without restriction    Physical Exam   Constitutional: She is oriented to person, place, and time. No distress.   HENT:   Head: Normocephalic and atraumatic.   Eyes: Conjunctivae and EOM are normal. Right eye exhibits no discharge. Left eye exhibits no discharge. No scleral icterus.   Neck: Normal range of motion. Neck supple. No thyromegaly present.   Cardiovascular: Normal rate, regular rhythm and normal heart sounds. Exam reveals no gallop and no friction rub. "   Pulmonary/Chest: Effort normal. No stridor. No respiratory distress. She has no wheezes.   Abdominal: Soft. Bowel sounds are normal. She exhibits no mass. There is no tenderness. There is no rebound and no guarding.   Musculoskeletal: Normal range of motion. She exhibits no tenderness.   Lymphadenopathy:     She has no cervical adenopathy.   Neurological: She is alert and oriented to person, place, and time. She exhibits normal muscle tone.   Skin: Skin is warm. No rash noted. She is not diaphoretic. No erythema.   Psychiatric: She has a normal mood and affect. Her behavior is normal.   Nursing note and vitals reviewed.      RECENT LABS    WBC   Date Value Ref Range Status   11/05/2019 4.66 3.40 - 10.80 10*3/mm3 Final     RBC   Date Value Ref Range Status   11/05/2019 4.10 3.77 - 5.28 10*6/mm3 Final     Hemoglobin   Date Value Ref Range Status   11/05/2019 12.6 12.0 - 15.9 g/dL Final     Hematocrit   Date Value Ref Range Status   11/05/2019 37.3 34.0 - 46.6 % Final     MCV   Date Value Ref Range Status   11/05/2019 91.0 79.0 - 97.0 fL Final     MCH   Date Value Ref Range Status   11/05/2019 30.7 26.6 - 33.0 pg Final     MCHC   Date Value Ref Range Status   11/05/2019 33.8 31.5 - 35.7 g/dL Final     RDW   Date Value Ref Range Status   11/05/2019 11.9 (L) 12.3 - 15.4 % Final     RDW-SD   Date Value Ref Range Status   11/05/2019 38.8 37.0 - 54.0 fl Final     MPV   Date Value Ref Range Status   11/05/2019 10.3 6.0 - 12.0 fL Final     Platelets   Date Value Ref Range Status   11/05/2019 209 140 - 450 10*3/mm3 Final     Neutrophil %   Date Value Ref Range Status   11/05/2019 52.4 42.7 - 76.0 % Final     Lymphocyte %   Date Value Ref Range Status   11/05/2019 38.2 19.6 - 45.3 % Final     Monocyte %   Date Value Ref Range Status   11/05/2019 7.9 5.0 - 12.0 % Final     Eosinophil %   Date Value Ref Range Status   11/05/2019 1.1 0.3 - 6.2 % Final     Basophil %   Date Value Ref Range Status   11/05/2019 0.4 0.0 - 1.5 %  Final     Neutrophils, Absolute   Date Value Ref Range Status   11/05/2019 2.44 1.70 - 7.00 10*3/mm3 Final     Lymphocytes, Absolute   Date Value Ref Range Status   11/05/2019 1.78 0.70 - 3.10 10*3/mm3 Final     Monocytes, Absolute   Date Value Ref Range Status   11/05/2019 0.37 0.10 - 0.90 10*3/mm3 Final     Eosinophils, Absolute   Date Value Ref Range Status   11/05/2019 0.05 0.00 - 0.40 10*3/mm3 Final     Basophils, Absolute   Date Value Ref Range Status   11/05/2019 0.02 0.00 - 0.20 10*3/mm3 Final     nRBC   Date Value Ref Range Status   04/26/2019 0 0 /100[WBCs] Final       Lab Results   Component Value Date    GLUCOSE 95 04/26/2019    BUN 12 04/26/2019    CREATININE 0.7 04/26/2019    BCR 17.1 04/26/2019    K 4.6 04/26/2019    CO2 25 04/26/2019    CALCIUM 9.0 04/26/2019    PROTENTOTREF 6.7 06/25/2019    ALBUMIN 3.4 06/25/2019    LABIL2 1.1 06/25/2019    AST 25 04/26/2019    ALT 16 04/26/2019         Assessment/Plan     Leukopenia, unspecified type  - CBC & Differential  - CBC Auto Differential    Iron deficiency  - Iron Profile  - Ferritin      ASSESSMENT:    1. Leukopenia with negative work-up  2. Iron deficiency without anemia  3. Family history of cancer patient has been given information on cancer genetics.  She will let me know how she wants to proceed in the future.  4. Needs colonoscopy.  Patient will schedule with her primary care physician       PLANS:     She has chronic leukopenia of unclear etiology.  Work-up has been negative.  Also of note is that her leukopenia has resolved.  Her peripheral smear did not reveal any leukoerythroblastic changes to suggest primary bone marrow disease.  She has slightly reduced reticulocyte counts suggesting some bone marrow suppression but no overt cytologic abnormalities.  Therefore would observe for now.  Patient has iron deficiency without anemia.  She has never had a colonoscopy in the past due to problems with co-pay.  She is now willing to follow-up with GI.  She is scheduled for colonoscopy on 11/7/19. Her history and physical exam is benign.    I have reviewed labs results, imaging, vitals, and medications with the patient today. Will follow up in 4 months with me.  Her urinalysis  does not show hematuria       .     Patient verbalized understanding and is in agreement of the above plan.       Part of this document was scribed by Araceli Adorno RN, BSN.

## 2019-11-06 ENCOUNTER — TELEPHONE (OUTPATIENT)
Dept: ONCOLOGY | Facility: CLINIC | Age: 64
End: 2019-11-06

## 2019-11-06 NOTE — TELEPHONE ENCOUNTER
I called the patient and I let her know that her ferritin level was 40 and that Dr. Patterson stated that she did not need to continue iron supplementation and that she will need to continue to follow up with her PCP in 4 months.  The patient voiced understanding.

## 2019-11-07 ENCOUNTER — ON CAMPUS - OUTPATIENT (OUTPATIENT)
Dept: URBAN - METROPOLITAN AREA HOSPITAL 77 | Facility: HOSPITAL | Age: 64
End: 2019-11-07
Payer: COMMERCIAL

## 2019-11-07 DIAGNOSIS — Z86.010 PERSONAL HISTORY OF COLONIC POLYPS: ICD-10-CM

## 2019-11-07 DIAGNOSIS — K64.1 SECOND DEGREE HEMORRHOIDS: ICD-10-CM

## 2019-11-07 DIAGNOSIS — D12.2 BENIGN NEOPLASM OF ASCENDING COLON: ICD-10-CM

## 2019-11-07 DIAGNOSIS — D12.3 BENIGN NEOPLASM OF TRANSVERSE COLON: ICD-10-CM

## 2019-11-07 PROCEDURE — 45385 COLONOSCOPY W/LESION REMOVAL: CPT | Mod: 33 | Performed by: INTERNAL MEDICINE

## 2019-11-07 PROCEDURE — 45380 COLONOSCOPY AND BIOPSY: CPT | Mod: 33,59 | Performed by: INTERNAL MEDICINE

## 2020-06-19 ENCOUNTER — TELEPHONE (OUTPATIENT)
Dept: FAMILY MEDICINE CLINIC | Facility: CLINIC | Age: 65
End: 2020-06-19

## 2020-06-19 DIAGNOSIS — Z12.31 ENCOUNTER FOR SCREENING MAMMOGRAM FOR BREAST CANCER: Primary | ICD-10-CM

## 2020-06-19 NOTE — TELEPHONE ENCOUNTER
Pt would like an [facility] sent to [facility] for [facility] mammogram and she is scheduled for labs next week if you can add order for that.  Last seen 4/19.  Next ov 7/2020

## 2020-06-22 DIAGNOSIS — E78.2 MIXED HYPERLIPIDEMIA: Primary | ICD-10-CM

## 2020-06-30 ENCOUNTER — CLINICAL SUPPORT (OUTPATIENT)
Dept: FAMILY MEDICINE CLINIC | Facility: CLINIC | Age: 65
End: 2020-06-30

## 2020-06-30 DIAGNOSIS — R89.9 ABNORMAL LABORATORY TEST: Primary | ICD-10-CM

## 2020-06-30 DIAGNOSIS — E78.2 MIXED HYPERLIPIDEMIA: ICD-10-CM

## 2020-06-30 PROCEDURE — 80061 LIPID PANEL: CPT | Performed by: FAMILY MEDICINE

## 2020-06-30 PROCEDURE — 36415 COLL VENOUS BLD VENIPUNCTURE: CPT | Performed by: FAMILY MEDICINE

## 2020-06-30 PROCEDURE — 80053 COMPREHEN METABOLIC PANEL: CPT | Performed by: FAMILY MEDICINE

## 2020-06-30 PROCEDURE — 85025 COMPLETE CBC W/AUTO DIFF WBC: CPT | Performed by: FAMILY MEDICINE

## 2020-07-01 LAB
ALBUMIN SERPL-MCNC: 3.9 G/DL (ref 3.5–5.2)
ALBUMIN/GLOB SERPL: 1.4 G/DL
ALP SERPL-CCNC: 54 U/L (ref 39–117)
ALT SERPL W P-5'-P-CCNC: 16 U/L (ref 1–33)
ANION GAP SERPL CALCULATED.3IONS-SCNC: 7.6 MMOL/L (ref 5–15)
AST SERPL-CCNC: 22 U/L (ref 1–32)
BASOPHILS # BLD AUTO: 0.03 10*3/MM3 (ref 0–0.2)
BASOPHILS NFR BLD AUTO: 0.8 % (ref 0–1.5)
BILIRUB SERPL-MCNC: 0.2 MG/DL (ref 0.2–1.2)
BUN SERPL-MCNC: 12 MG/DL (ref 8–23)
BUN/CREAT SERPL: 17.1 (ref 7–25)
CALCIUM SPEC-SCNC: 9.5 MG/DL (ref 8.6–10.5)
CHLORIDE SERPL-SCNC: 103 MMOL/L (ref 98–107)
CHOLEST SERPL-MCNC: 202 MG/DL (ref 0–200)
CO2 SERPL-SCNC: 27.4 MMOL/L (ref 22–29)
CREAT SERPL-MCNC: 0.7 MG/DL (ref 0.57–1)
DEPRECATED RDW RBC AUTO: 41 FL (ref 37–54)
EOSINOPHIL # BLD AUTO: 0.04 10*3/MM3 (ref 0–0.4)
EOSINOPHIL NFR BLD AUTO: 1.1 % (ref 0.3–6.2)
ERYTHROCYTE [DISTWIDTH] IN BLOOD BY AUTOMATED COUNT: 12.2 % (ref 12.3–15.4)
GFR SERPL CREATININE-BSD FRML MDRD: 84 ML/MIN/1.73
GLOBULIN UR ELPH-MCNC: 2.8 GM/DL
GLUCOSE SERPL-MCNC: 88 MG/DL (ref 65–99)
HCT VFR BLD AUTO: 38.6 % (ref 34–46.6)
HDLC SERPL-MCNC: 63 MG/DL (ref 40–60)
HGB BLD-MCNC: 12.9 G/DL (ref 12–15.9)
IMM GRANULOCYTES # BLD AUTO: 0 10*3/MM3 (ref 0–0.05)
IMM GRANULOCYTES NFR BLD AUTO: 0 % (ref 0–0.5)
LDLC SERPL CALC-MCNC: 97 MG/DL (ref 0–100)
LDLC/HDLC SERPL: 1.53 {RATIO}
LYMPHOCYTES # BLD AUTO: 1.69 10*3/MM3 (ref 0.7–3.1)
LYMPHOCYTES NFR BLD AUTO: 45.1 % (ref 19.6–45.3)
MCH RBC QN AUTO: 30.6 PG (ref 26.6–33)
MCHC RBC AUTO-ENTMCNC: 33.4 G/DL (ref 31.5–35.7)
MCV RBC AUTO: 91.5 FL (ref 79–97)
MONOCYTES # BLD AUTO: 0.33 10*3/MM3 (ref 0.1–0.9)
MONOCYTES NFR BLD AUTO: 8.8 % (ref 5–12)
NEUTROPHILS NFR BLD AUTO: 1.66 10*3/MM3 (ref 1.7–7)
NEUTROPHILS NFR BLD AUTO: 44.2 % (ref 42.7–76)
NRBC BLD AUTO-RTO: 0.3 /100 WBC (ref 0–0.2)
PLATELET # BLD AUTO: 221 10*3/MM3 (ref 140–450)
PMV BLD AUTO: 11.6 FL (ref 6–12)
POTASSIUM SERPL-SCNC: 4.5 MMOL/L (ref 3.5–5.2)
PROT SERPL-MCNC: 6.7 G/DL (ref 6–8.5)
RBC # BLD AUTO: 4.22 10*6/MM3 (ref 3.77–5.28)
SODIUM SERPL-SCNC: 138 MMOL/L (ref 136–145)
TRIGL SERPL-MCNC: 212 MG/DL (ref 0–150)
VLDLC SERPL-MCNC: 42.4 MG/DL (ref 5–40)
WBC # BLD AUTO: 3.75 10*3/MM3 (ref 3.4–10.8)

## 2020-07-07 ENCOUNTER — OFFICE VISIT (OUTPATIENT)
Dept: FAMILY MEDICINE CLINIC | Facility: CLINIC | Age: 65
End: 2020-07-07

## 2020-07-07 VITALS
BODY MASS INDEX: 23.3 KG/M2 | RESPIRATION RATE: 16 BRPM | DIASTOLIC BLOOD PRESSURE: 74 MMHG | SYSTOLIC BLOOD PRESSURE: 119 MMHG | WEIGHT: 145 LBS | HEIGHT: 66 IN | OXYGEN SATURATION: 99 % | HEART RATE: 62 BPM | TEMPERATURE: 97.3 F

## 2020-07-07 DIAGNOSIS — R92.8 MAMMOGRAM ABNORMAL: ICD-10-CM

## 2020-07-07 DIAGNOSIS — J30.1 SEASONAL ALLERGIC RHINITIS DUE TO POLLEN: Primary | ICD-10-CM

## 2020-07-07 PROCEDURE — 99213 OFFICE O/P EST LOW 20 MIN: CPT | Performed by: FAMILY MEDICINE

## 2020-07-07 NOTE — PROGRESS NOTES
Subjective   Kym Yin is a 65 y.o. female.     Chief Complaint   Patient presents with   • Results     Discuss mammogram and blood work results.          Current Outpatient Medications:   •  cetirizine (zyrTEC) 10 MG tablet, Take 10 mg by mouth Daily., Disp: , Rfl:   •  Cholecalciferol (VITAMIN D3) 2000 units tablet, Take 1 tablet by mouth Daily., Disp: , Rfl:   •  diphenhydrAMINE (BENADRYL) 25 MG tablet, Take 12.5 mg by mouth At Night As Needed for Itching., Disp: , Rfl:   •  Elderberry 575 MG/5ML syrup, Take 15 mL by mouth Daily., Disp: , Rfl:     Past Medical History:   Diagnosis Date   • Allergic    • Arthritis    • Colon polyp     2019 = TA   • Endometriosis    • Hard of hearing     B/ L Aids   • MAMMO     2019= NEG       Past Surgical History:   Procedure Laterality Date   • BREAST BIOPSY Right     Bx = NEG   • COLONOSCOPY      TA = 2019, rech 2022 [facility]   • EXCISION BAKERS CYST KNEE      Behind the knee   • EYE SURGERY      B/L Cataract Sx   • HYSTERECTOMY  2017    Total due to dysfuntional uterine bleeding   • INGUINAL HERNIA REPAIR         Family History   Problem Relation Age of Onset   • Stroke Father    • Parkinsonism Father    • Heart disease Father    • Hypertension Father    • Hyperlipidemia Father    • Cancer Father 62        Prostate Cancer   • Breast cancer Sister 56   • Cancer Sister 62         Neck Cancer @57/ Breast Cancer   • Hyperlipidemia Sister    • Breast cancer Maternal Grandmother 52   • Cancer Maternal Grandmother         Breast Cancer   • Cancer Maternal Grandfather    • Breast cancer Paternal Cousin 40   • Heart disease Mother         CHF   • Heart disease Brother         CAD/ AFib   • Alcohol abuse Brother         Hx   • Diabetes Brother    • Hypertension Brother        Social History     Socioeconomic History   • Marital status:      Spouse name: Not on file   • Number of children: Not on file   • Years of education: Not on file   • Highest education level: Not on file    Tobacco Use   • Smoking status: Former Smoker     Packs/day: 1.00     Years: 12.00     Pack years: 12.00     Start date:      Last attempt to quit:      Years since quittin.5   • Smokeless tobacco: Never Used   Substance and Sexual Activity   • Alcohol use: Yes     Comment: occasionally   • Drug use: No   • Sexual activity: Defer       64 y/o C female here for annual appt w/ hx/o Allergies    Pt recently had labs done and doing well    Pt had to have f/u mammo testing but ok and just needs another Diag mammo in 6 mos  Colonoscopy done last yr and found a polyp       The following portions of the patient's history were reviewed and updated as appropriate: allergies, current medications, past family history, past medical history, past social history, past surgical history and problem list.    Review of Systems   Constitutional: Negative for activity change, appetite change, fatigue, unexpected weight gain and unexpected weight loss.   Respiratory: Negative for cough, chest tightness and shortness of breath.    Cardiovascular: Negative for chest pain, palpitations and leg swelling.   Gastrointestinal: Negative for constipation, diarrhea, nausea, vomiting and GERD.   Genitourinary: Negative for frequency, urgency and urinary incontinence.   Musculoskeletal: Negative for arthralgias and myalgias.   Skin: Negative for rash and bruise.   Neurological: Negative for dizziness, facial asymmetry, speech difficulty, weakness, light-headedness, headache, memory problem and confusion.   Psychiatric/Behavioral: Negative for decreased concentration and depressed mood. The patient is not nervous/anxious.        Vitals:    20 1050   BP: 119/74   Pulse: 62   Resp: 16   Temp: 97.3 °F (36.3 °C)   SpO2: 99%       Objective   Physical Exam   Constitutional: She is oriented to person, place, and time. She appears well-developed and well-nourished. No distress.   HENT:   Head: Normocephalic and atraumatic.   Neck:  Normal range of motion. Neck supple.   Cardiovascular: Normal rate, regular rhythm, normal heart sounds and intact distal pulses.   No murmur heard.  Pulmonary/Chest: Effort normal and breath sounds normal. No respiratory distress.   Musculoskeletal: She exhibits no edema.   Neurological: She is alert and oriented to person, place, and time. No cranial nerve deficit.   Skin: Skin is warm and dry. No rash noted.   Psychiatric: She has a normal mood and affect. Her behavior is normal. Judgment and thought content normal.   Nursing note and vitals reviewed.        Assessment/Plan   Kym was seen today for results.    Diagnoses and all orders for this visit:    Seasonal allergic rhinitis due to pollen    Mammogram abnormal  -     US Breast Right Limited; Future  -     Cancel: Mammo Diagnostic Digital Tomosynthesis Right With CAD; Future  -     Mammo Diagnostic Digital Tomosynthesis Right With CAD; Future  -     Mammo Diagnostic Digital Tomosynthesis Right With CAD    Other orders  -     Elderberry 575 MG/5ML syrup; Take 15 mL by mouth Daily.

## 2020-07-09 DIAGNOSIS — R92.8 MAMMOGRAM ABNORMAL: Primary | ICD-10-CM

## 2020-08-21 ENCOUNTER — OFFICE VISIT (OUTPATIENT)
Dept: FAMILY MEDICINE CLINIC | Facility: CLINIC | Age: 65
End: 2020-08-21

## 2020-08-21 VITALS
TEMPERATURE: 97.1 F | SYSTOLIC BLOOD PRESSURE: 126 MMHG | WEIGHT: 148 LBS | HEART RATE: 65 BPM | HEIGHT: 66 IN | BODY MASS INDEX: 23.78 KG/M2 | OXYGEN SATURATION: 100 % | DIASTOLIC BLOOD PRESSURE: 78 MMHG | RESPIRATION RATE: 16 BRPM

## 2020-08-21 DIAGNOSIS — M25.551 HIP PAIN, BILATERAL: Primary | ICD-10-CM

## 2020-08-21 DIAGNOSIS — D50.9 IRON DEFICIENCY ANEMIA, UNSPECIFIED IRON DEFICIENCY ANEMIA TYPE: ICD-10-CM

## 2020-08-21 DIAGNOSIS — M25.552 HIP PAIN, BILATERAL: Primary | ICD-10-CM

## 2020-08-21 DIAGNOSIS — M89.9 DISORDER OF BONE, UNSPECIFIED: ICD-10-CM

## 2020-08-21 PROCEDURE — 82728 ASSAY OF FERRITIN: CPT | Performed by: FAMILY MEDICINE

## 2020-08-21 PROCEDURE — 86140 C-REACTIVE PROTEIN: CPT | Performed by: FAMILY MEDICINE

## 2020-08-21 PROCEDURE — 36415 COLL VENOUS BLD VENIPUNCTURE: CPT | Performed by: FAMILY MEDICINE

## 2020-08-21 PROCEDURE — 85652 RBC SED RATE AUTOMATED: CPT | Performed by: FAMILY MEDICINE

## 2020-08-21 PROCEDURE — 83540 ASSAY OF IRON: CPT | Performed by: FAMILY MEDICINE

## 2020-08-21 PROCEDURE — 99213 OFFICE O/P EST LOW 20 MIN: CPT | Performed by: FAMILY MEDICINE

## 2020-08-21 PROCEDURE — 84466 ASSAY OF TRANSFERRIN: CPT | Performed by: FAMILY MEDICINE

## 2020-08-21 PROCEDURE — 82306 VITAMIN D 25 HYDROXY: CPT | Performed by: FAMILY MEDICINE

## 2020-08-21 NOTE — PROGRESS NOTES
Answers for HPI/ROS submitted by the patient on 8/18/2020   What is the primary reason for your visit?: Other  Please describe your symptoms.: Muscle pain. Right hip, middle back  Have you had these symptoms before?: Yes  How long have you been having these symptoms?: Greater than 2 weeks  Subjective   Kym Yin is a 65 y.o. female.     Chief Complaint   Patient presents with   • Back Pain     back pain and bilateral hip pain that is affecting her walking.Patient has had the back pain for several months.    • Fatigue         Current Outpatient Medications:   •  cetirizine (zyrTEC) 10 MG tablet, Take 10 mg by mouth Daily., Disp: , Rfl:   •  Cholecalciferol (VITAMIN D3) 2000 units tablet, Take 1 tablet by mouth Daily., Disp: , Rfl:   •  diphenhydrAMINE (BENADRYL) 25 MG tablet, Take 12.5 mg by mouth At Night As Needed for Itching., Disp: , Rfl:   •  Elderberry 575 MG/5ML syrup, Take 15 mL by mouth Daily., Disp: , Rfl:     Past Medical History:   Diagnosis Date   • Allergic    • Arthritis    • Colon polyp     2019 = TA   • Endometriosis    • Hard of hearing     B/ L Aids   • MAMMO     2019= NEG       Past Surgical History:   Procedure Laterality Date   • BREAST BIOPSY Right     Bx = NEG   • COLONOSCOPY      TA = 2019, rech 2022 GSI   • EXCISION BAKERS CYST KNEE      Behind the knee   • EYE SURGERY      B/L Cataract Sx   • HYSTERECTOMY  2017    Total due to dysfuntional uterine bleeding   • INGUINAL HERNIA REPAIR         Family History   Problem Relation Age of Onset   • Stroke Father    • Parkinsonism Father    • Heart disease Father    • Hypertension Father    • Hyperlipidemia Father    • Cancer Father 62        Prostate Cancer   • Breast cancer Sister 56   • Cancer Sister 62         Neck Cancer @57/ Breast Cancer   • Hyperlipidemia Sister    • Breast cancer Maternal Grandmother 52   • Cancer Maternal Grandmother         Breast Cancer   • Cancer Maternal Grandfather    • Breast cancer Paternal Cousin 40   •  Heart disease Mother         CHF   • Heart disease Brother         CAD/ AFib   • Alcohol abuse Brother         Hx   • Diabetes Brother    • Hypertension Brother        Social History     Socioeconomic History   • Marital status:      Spouse name: Not on file   • Number of children: Not on file   • Years of education: Not on file   • Highest education level: Not on file   Tobacco Use   • Smoking status: Former Smoker     Packs/day: 1.00     Years: 12.00     Pack years: 12.00     Start date:      Last attempt to quit:      Years since quittin.6   • Smokeless tobacco: Never Used   Substance and Sexual Activity   • Alcohol use: Yes     Comment: occasionally   • Drug use: No   • Sexual activity: Defer       64 y/o C female here w/ c/o's back pain x 6+mos    Pt w/o known injury and never had this issue in the past;  Pt states she feels weak in her mid back and b/l hip area but not falling; Pt went to her [facility] and it is not her joints or alignment w/ this c/o; Pt does yoga daily and done this x 11yrs; pt also walking about 2mi and not able to stay at the same pace due to increasing ms tightness/ soreness --------no known injury       The following portions of the patient's history were reviewed and updated as appropriate: allergies, current medications, past family history, past medical history, past social history, past surgical history and problem list.    Review of Systems   Constitutional: Positive for fatigue. Negative for activity change, appetite change, unexpected weight gain and unexpected weight loss.   Respiratory: Negative for cough, chest tightness and shortness of breath.    Cardiovascular: Negative for chest pain, palpitations and leg swelling.   Gastrointestinal: Negative for constipation and diarrhea.   Genitourinary: Negative for frequency, urgency and urinary incontinence.   Musculoskeletal: Positive for arthralgias, back pain and myalgias. Negative for joint swelling.   Skin:  Negative for rash and bruise.   Neurological: Negative for dizziness, facial asymmetry, speech difficulty, weakness, light-headedness, numbness, headache, memory problem and confusion.   Psychiatric/Behavioral: Negative for sleep disturbance.       Vitals:    08/21/20 0828   BP: 126/78   Pulse: 65   Resp: 16   Temp: 97.1 °F (36.2 °C)   SpO2: 100%       Objective   Physical Exam   Constitutional: She is oriented to person, place, and time. She appears well-developed and well-nourished. No distress.   HENT:   Head: Normocephalic and atraumatic.   Neck: Normal range of motion. Neck supple.   Cardiovascular: Normal rate, regular rhythm, normal heart sounds and intact distal pulses.   No murmur heard.  Pulmonary/Chest: Effort normal and breath sounds normal. No respiratory distress.   Abdominal: Soft. Bowel sounds are normal. She exhibits no distension.   Musculoskeletal: She exhibits no edema or tenderness (NTTP @ b/l femoral heads).        Lumbar back: She exhibits no tenderness, no bony tenderness, no swelling, no edema and no pain.   Neurological: She is alert and oriented to person, place, and time. No cranial nerve deficit.   Skin: Skin is warm and dry. No rash noted.   Psychiatric: She has a normal mood and affect. Her behavior is normal. Judgment and thought content normal.   Nursing note and vitals reviewed.        Assessment/Plan   Kym was seen today for back pain and fatigue.    Diagnoses and all orders for this visit:    Hip pain, bilateral  -     Sedimentation Rate  -     C-reactive Protein  -     Vitamin D 25 Hydroxy  -     XR Hips Bilateral With or Without Pelvis 2 View; Future    Iron deficiency anemia, unspecified iron deficiency anemia type  -     Iron Profile  -     Ferritin    Disorder of bone, unspecified   -     Vitamin D 25 Hydroxy  -     DEXA Bone Density Axial; Future

## 2020-08-22 LAB
25(OH)D3 SERPL-MCNC: 60 NG/ML (ref 30–100)
CRP SERPL-MCNC: 0.32 MG/DL (ref 0–0.5)
ERYTHROCYTE [SEDIMENTATION RATE] IN BLOOD: 7 MM/HR (ref 0–30)
FERRITIN SERPL-MCNC: 38.7 NG/ML (ref 13–150)
IRON 24H UR-MRATE: 82 MCG/DL (ref 37–145)
IRON SATN MFR SERPL: 21 % (ref 20–50)
TIBC SERPL-MCNC: 387 MCG/DL (ref 298–536)
TRANSFERRIN SERPL-MCNC: 260 MG/DL (ref 200–360)

## 2020-08-24 ENCOUNTER — TELEPHONE (OUTPATIENT)
Dept: FAMILY MEDICINE CLINIC | Facility: CLINIC | Age: 65
End: 2020-08-24

## 2020-08-24 DIAGNOSIS — R53.83 FATIGUE, UNSPECIFIED TYPE: Primary | ICD-10-CM

## 2020-08-24 NOTE — TELEPHONE ENCOUNTER
Patient called stating that when she was in last she said she did not want to have her thyroid checked and now she wishes that she did. Patient states that she is going to [facility] on Thursday, can you put in the TSH,T3,T4 in for [facility] and she will have it drawn there.

## 2020-08-25 ENCOUNTER — PATIENT MESSAGE (OUTPATIENT)
Dept: FAMILY MEDICINE CLINIC | Facility: CLINIC | Age: 65
End: 2020-08-25

## 2020-08-25 NOTE — TELEPHONE ENCOUNTER
I called to let the patient know I faxed the thyroid blood work orders to [facility]. I also left on her message that we sent her a my chart message on her labs and xray results.

## 2020-08-31 ENCOUNTER — TELEPHONE (OUTPATIENT)
Dept: FAMILY MEDICINE CLINIC | Facility: CLINIC | Age: 65
End: 2020-08-31

## 2020-08-31 DIAGNOSIS — M25.551 HIP PAIN, BILATERAL: Primary | ICD-10-CM

## 2020-08-31 DIAGNOSIS — M54.50 CHRONIC LOW BACK PAIN, UNSPECIFIED BACK PAIN LATERALITY, UNSPECIFIED WHETHER SCIATICA PRESENT: ICD-10-CM

## 2020-08-31 DIAGNOSIS — M25.511 RIGHT SHOULDER PAIN, UNSPECIFIED CHRONICITY: ICD-10-CM

## 2020-08-31 DIAGNOSIS — G89.29 CHRONIC LOW BACK PAIN, UNSPECIFIED BACK PAIN LATERALITY, UNSPECIFIED WHETHER SCIATICA PRESENT: ICD-10-CM

## 2020-08-31 DIAGNOSIS — M25.552 HIP PAIN, BILATERAL: Primary | ICD-10-CM

## 2020-08-31 NOTE — TELEPHONE ENCOUNTER
Patient called stating that she does not want to do a MRI but she does want to see PT.     Patient states that she has seen Bo Suarez at [facility]- she thinks he is at the one on [address]   Patient states that it has to say PT for her back and right shoulder for possible RCT verses capsule tightness for 2 sessions.

## 2020-08-31 NOTE — TELEPHONE ENCOUNTER
Pt is asking if you can add PT orders for her R shoulder as well as her lumbar. See note below.     You had prev recom PT or MRI on xray results.

## 2020-12-31 ENCOUNTER — TELEPHONE (OUTPATIENT)
Dept: FAMILY MEDICINE CLINIC | Facility: CLINIC | Age: 65
End: 2020-12-31

## 2020-12-31 DIAGNOSIS — R92.8 MAMMOGRAM ABNORMAL: Primary | ICD-10-CM

## 2021-07-27 ENCOUNTER — OFFICE VISIT (OUTPATIENT)
Dept: FAMILY MEDICINE CLINIC | Facility: CLINIC | Age: 66
End: 2021-07-27

## 2021-07-27 VITALS
DIASTOLIC BLOOD PRESSURE: 75 MMHG | HEIGHT: 66 IN | OXYGEN SATURATION: 100 % | TEMPERATURE: 97.1 F | BODY MASS INDEX: 23.63 KG/M2 | HEART RATE: 70 BPM | RESPIRATION RATE: 14 BRPM | SYSTOLIC BLOOD PRESSURE: 117 MMHG | WEIGHT: 147 LBS

## 2021-07-27 DIAGNOSIS — Z12.31 ENCOUNTER FOR SCREENING MAMMOGRAM FOR BREAST CANCER: ICD-10-CM

## 2021-07-27 DIAGNOSIS — E78.2 MIXED HYPERLIPIDEMIA: ICD-10-CM

## 2021-07-27 DIAGNOSIS — M19.90 ARTHRITIS: Primary | ICD-10-CM

## 2021-07-27 PROCEDURE — 80061 LIPID PANEL: CPT | Performed by: FAMILY MEDICINE

## 2021-07-27 PROCEDURE — 80053 COMPREHEN METABOLIC PANEL: CPT | Performed by: FAMILY MEDICINE

## 2021-07-27 PROCEDURE — 99213 OFFICE O/P EST LOW 20 MIN: CPT | Performed by: FAMILY MEDICINE

## 2021-07-27 RX ORDER — NAPROXEN SODIUM 220 MG
220 TABLET ORAL 2 TIMES DAILY PRN
Start: 2021-07-27 | End: 2022-09-08

## 2021-07-27 RX ORDER — SENNOSIDES 8.6 MG
650 CAPSULE ORAL NIGHTLY PRN
Start: 2021-07-27 | End: 2022-09-08

## 2021-07-28 LAB
ALBUMIN SERPL-MCNC: 4.1 G/DL (ref 3.5–5.2)
ALBUMIN/GLOB SERPL: 1.3 G/DL
ALP SERPL-CCNC: 96 U/L (ref 39–117)
ALT SERPL W P-5'-P-CCNC: 26 U/L (ref 1–33)
ANION GAP SERPL CALCULATED.3IONS-SCNC: 9.6 MMOL/L (ref 5–15)
AST SERPL-CCNC: 30 U/L (ref 1–32)
BILIRUB SERPL-MCNC: 0.3 MG/DL (ref 0–1.2)
BUN SERPL-MCNC: 8 MG/DL (ref 8–23)
BUN/CREAT SERPL: 10.4 (ref 7–25)
CALCIUM SPEC-SCNC: 9.4 MG/DL (ref 8.6–10.5)
CHLORIDE SERPL-SCNC: 105 MMOL/L (ref 98–107)
CHOLEST SERPL-MCNC: 249 MG/DL (ref 0–200)
CO2 SERPL-SCNC: 28.4 MMOL/L (ref 22–29)
CREAT SERPL-MCNC: 0.77 MG/DL (ref 0.57–1)
GFR SERPL CREATININE-BSD FRML MDRD: 75 ML/MIN/1.73
GLOBULIN UR ELPH-MCNC: 3.2 GM/DL
GLUCOSE SERPL-MCNC: 79 MG/DL (ref 65–99)
HDLC SERPL-MCNC: 65 MG/DL (ref 40–60)
LDLC SERPL CALC-MCNC: 152 MG/DL (ref 0–100)
LDLC/HDLC SERPL: 2.27 {RATIO}
POTASSIUM SERPL-SCNC: 4.9 MMOL/L (ref 3.5–5.2)
PROT SERPL-MCNC: 7.3 G/DL (ref 6–8.5)
SODIUM SERPL-SCNC: 143 MMOL/L (ref 136–145)
TRIGL SERPL-MCNC: 181 MG/DL (ref 0–150)
VLDLC SERPL-MCNC: 32 MG/DL (ref 5–40)

## 2021-08-16 ENCOUNTER — TELEPHONE (OUTPATIENT)
Dept: FAMILY MEDICINE CLINIC | Facility: CLINIC | Age: 66
End: 2021-08-16

## 2022-07-29 ENCOUNTER — TELEPHONE (OUTPATIENT)
Dept: FAMILY MEDICINE CLINIC | Facility: CLINIC | Age: 67
End: 2022-07-29

## 2022-07-29 DIAGNOSIS — Z12.31 ENCOUNTER FOR SCREENING MAMMOGRAM FOR BREAST CANCER: Primary | ICD-10-CM

## 2022-07-29 NOTE — TELEPHONE ENCOUNTER
Caller: Kym Yin    Relationship: Self    Best call back number: [phone]     What orders are you requesting (i.e. lab or imaging): IMAGING - MAMMOGRAM    In what timeframe would the patient need to come in: AS SOON AS AVAILABLE    Where will you receive your lab/imaging services: [facility]    Additional notes: PATIENT WOULD PREFER TO HAVE THIS DONE PRIOR TO HER AVW IN 09/22

## 2022-09-08 ENCOUNTER — OFFICE VISIT (OUTPATIENT)
Dept: FAMILY MEDICINE CLINIC | Facility: CLINIC | Age: 67
End: 2022-09-08

## 2022-09-08 VITALS
RESPIRATION RATE: 14 BRPM | HEART RATE: 69 BPM | SYSTOLIC BLOOD PRESSURE: 107 MMHG | TEMPERATURE: 98.4 F | WEIGHT: 150 LBS | BODY MASS INDEX: 24.11 KG/M2 | HEIGHT: 66 IN | OXYGEN SATURATION: 98 % | DIASTOLIC BLOOD PRESSURE: 65 MMHG

## 2022-09-08 DIAGNOSIS — Z00.00 MEDICARE ANNUAL WELLNESS VISIT, SUBSEQUENT: Primary | ICD-10-CM

## 2022-09-08 DIAGNOSIS — E78.2 MIXED HYPERLIPIDEMIA: ICD-10-CM

## 2022-09-08 PROCEDURE — G0439 PPPS, SUBSEQ VISIT: HCPCS | Performed by: FAMILY MEDICINE

## 2022-09-08 PROCEDURE — 1160F RVW MEDS BY RX/DR IN RCRD: CPT | Performed by: FAMILY MEDICINE

## 2022-09-08 PROCEDURE — 1170F FXNL STATUS ASSESSED: CPT | Performed by: FAMILY MEDICINE

## 2022-09-08 RX ORDER — YOHIMBE BARK 500 MG
CAPSULE ORAL
COMMUNITY

## 2022-09-08 NOTE — PROGRESS NOTES
The ABCs of the Annual Wellness Visit  Subsequent Medicare Wellness Visit    Chief Complaint   Patient presents with   • Medicare Wellness-subsequent       Subjective   History of Present Illness:  Kym PLUMMER is a 67 y.o. female who presents for a Subsequent Medicare Wellness Visit.  Pt states she thot she had fibromyalgia last yr and read the mind/ body/ Prescription book-----by Oct 2021, she was able to change her mindset about pain; Pt able to manage her pain and do what she wants despite the discomfort    HEALTH RISK ASSESSMENT    Recent Hospitalizations:  No hospitalization(s) within the last year.    Current Medical Providers:  Patient Care Team:  Marcelle Martin DO as PCP - General    Smoking Status:  Social History     Tobacco Use   Smoking Status Former Smoker   • Packs/day: 1.00   • Years: 12.00   • Pack years: 12.00   • Start date: 1971   • Quit date: 1984   • Years since quittin.7   Smokeless Tobacco Never Used       Alcohol Consumption:  Social History     Substance and Sexual Activity   Alcohol Use Yes    Comment: occasionally       Depression Screen:   PHQ-2/PHQ-9 Depression Screening 2022   Little Interest or Pleasure in Doing Things 0-->not at all   Feeling Down, Depressed or Hopeless 0-->not at all   PHQ-9: Brief Depression Severity Measure Score 0       Fall Risk Screen:  BLANKA Fall Risk Assessment has not been completed.    Health Habits and Functional and Cognitive Screening:  Functional & Cognitive Status 2022   Do you have difficulty preparing food and eating? No   Do you have difficulty bathing yourself, getting dressed or grooming yourself? No   Do you have difficulty using the toilet? No   Do you have difficulty moving around from place to place? No   Do you have trouble with steps or getting out of a bed or a chair? No   Current Diet Well Balanced Diet   Dental Exam Up to date   Eye Exam Up to date   Exercise (times per week) 7 times per week   Current  Exercises Include Walking        Exercise Comment walking,yoga,streength training   Do you need help using the phone?  No   Are you deaf or do you have serious difficulty hearing?  Yes   Do you need help with transportation? No   Do you need help shopping? No   Do you need help preparing meals?  No   Do you need help with housework?  No   Do you need help with laundry? No   Do you need help taking your medications? No   Do you need help managing money? No   Do you ever drive or ride in a car without wearing a seat belt? No   Have you felt unusual stress, anger or loneliness in the last month? No   Who do you live with? Spouse   If you need help, do you have trouble finding someone available to you? No   Have you been bothered in the last four weeks by sexual problems? No   Do you have difficulty concentrating, remembering or making decisions? No         Does the patient have evidence of cognitive impairment? No    Asprin use counseling:Does not need ASA (and currently is not on it)    Age-appropriate Screening Schedule:  Refer to the list below for future screening recommendations based on patient's age, sex and/or medical conditions. Orders for these recommended tests are listed in the plan section. The patient has been provided with a written plan.    Health Maintenance   Topic Date Due   • LIPID PANEL  07/27/2022   • DXA SCAN  08/27/2022   • INFLUENZA VACCINE  10/01/2022   • MAMMOGRAM  08/12/2023   • TDAP/TD VACCINES (2 - Tdap) 06/06/2031   • ZOSTER VACCINE  Completed          The following portions of the patient's history were reviewed and updated as appropriate: allergies, current medications, past family history, past medical history, past social history, past surgical history and problem list.    Outpatient Medications Prior to Visit   Medication Sig Dispense Refill   • cetirizine (zyrTEC) 10 MG tablet Take 10 mg by mouth Daily.     • Cholecalciferol (VITAMIN D3) 2000 units tablet Take 1 tablet by mouth  "Daily.     • Brittani 500 MG capsule 2 po qd     • Nutritional Supplements (MENOPAUSE FORMULA PO) Pueraria Mirifica 150mg   1 po qd     • acetaminophen (Tylenol 8 Hour) 650 MG 8 hr tablet Take 1 tablet by mouth At Night As Needed for Mild Pain .     • CBD (cannabidiol) oral oil Take  by mouth.     • Elderberry 575 MG/5ML syrup Take 15 mL by mouth Daily.     • naproxen sodium (Aleve) 220 MG tablet Take 1 tablet by mouth 2 (Two) Times a Day As Needed for Mild Pain .       No facility-administered medications prior to visit.       Patient Active Problem List   Diagnosis   • Other neutropenia (HCC)   • Arthritis       Advanced Care Planning:  ACP discussion was held with the patient during this visit. Patient has an advance directive in EMR which is still valid.     Review of Systems    Compared to one year ago, the patient feels her physical health is better.  Compared to one year ago, the patient feels her mental health is better.    Reviewed chart for potential of high risk medication in the elderly: yes  Reviewed chart for potential of harmful drug interactions in the elderly:yes    Objective         Vitals:    09/08/22 0908   BP: 107/65   BP Location: Right arm   Patient Position: Sitting   Cuff Size: Adult   Pulse: 69   Resp: 14   Temp: 98.4 °F (36.9 °C)   TempSrc: Infrared   SpO2: 98%   Weight: 68 kg (150 lb)   Height: 166.4 cm (65.5\")       Body mass index is 24.58 kg/m².  Discussed the patient's BMI with her. The BMI is in the acceptable range.    Physical Exam  Vitals and nursing note reviewed.   Constitutional:       General: She is not in acute distress.     Appearance: She is not ill-appearing or toxic-appearing.   HENT:      Head: Normocephalic and atraumatic.   Cardiovascular:      Rate and Rhythm: Normal rate and regular rhythm.      Heart sounds: No murmur heard.  Pulmonary:      Effort: Pulmonary effort is normal.   Neurological:      Mental Status: She is alert and oriented to person, place, and time.     "  Cranial Nerves: No cranial nerve deficit.      Gait: Gait normal.   Psychiatric:         Mood and Affect: Mood normal.         Behavior: Behavior normal.         Thought Content: Thought content normal.         Judgment: Judgment normal.                   Assessment & Plan   Medicare Risks and Personalized Health Plan  CMS Preventative Services Quick Reference  Breast Cancer/Mammogram Screening  Colon Cancer Screening  Dementia/Memory   Depression/Dysphoria  Diabetic Lab Screening   Fall Risk  Glaucoma Risk  Hearing Problem  Immunizations Discussed/Encouraged (specific immunizations; Influenza, Pneumococcal 23, Prevnar 20 (Pneumococcal 20-valent conjugate), Vaxneuvance (Pneumococcal 15-valent conjugate), Shingrix and COVID19 )  Inadequate Social Support, Isolation, Loneliness, Lack of Transportation, Financial Difficulties, or Caregiver Stress   Inactivity/Sedentary  Lung Cancer Risk  Osteoporosis Risk    The above risks/problems have been discussed with the patient.  Pertinent information has been shared with the patient in the After Visit Summary.  Follow up plans and orders are seen below in the Assessment/Plan Section.    Diagnoses and all orders for this visit:    1. Medicare annual wellness visit, subsequent (Primary)    2. Mixed hyperlipidemia  -     Lipid Panel; Future  -     Comprehensive Metabolic Panel; Future      Follow Up:  No follow-ups on file.     An After Visit Summary and PPPS were given to the patient.

## 2022-11-18 ENCOUNTER — ON CAMPUS - OUTPATIENT (OUTPATIENT)
Dept: URBAN - METROPOLITAN AREA HOSPITAL 77 | Facility: HOSPITAL | Age: 67
End: 2022-11-18

## 2022-11-18 DIAGNOSIS — Z86.010 PERSONAL HISTORY OF COLONIC POLYPS: ICD-10-CM

## 2022-11-18 DIAGNOSIS — D12.2 BENIGN NEOPLASM OF ASCENDING COLON: ICD-10-CM

## 2022-11-18 DIAGNOSIS — D12.3 BENIGN NEOPLASM OF TRANSVERSE COLON: ICD-10-CM

## 2022-11-18 DIAGNOSIS — K62.1 RECTAL POLYP: ICD-10-CM

## 2022-11-18 PROCEDURE — 45385 COLONOSCOPY W/LESION REMOVAL: CPT | Mod: PT | Performed by: INTERNAL MEDICINE

## 2022-12-14 ENCOUNTER — TELEPHONE (OUTPATIENT)
Dept: FAMILY MEDICINE CLINIC | Facility: CLINIC | Age: 67
End: 2022-12-14

## 2022-12-14 NOTE — TELEPHONE ENCOUNTER
Caller: Kym PLUMMER    Relationship: Self    Best call back number: [phone]    What orders are you requesting (i.e. lab or imaging): ESTROGEN LABS    In what timeframe would the patient need to come in: 12.14    Where will you receive your lab/imaging services: [facility]    Additional notes:       HAS ACTIVE ORDERS FOR LABS, BUT WOULD LIKE HER ESTROGEN LABS CHECKED ALONG WITH THE OTHERS.

## 2022-12-15 NOTE — TELEPHONE ENCOUNTER
HUB to share    It will be low since she is not taking any estrogen..... why does she want it done?     LVM asking pt

## 2022-12-15 NOTE — TELEPHONE ENCOUNTER
Patient called back and said to disregard her request for the estrogen lab.  Patient states she no longer wants it.

## 2022-12-16 ENCOUNTER — LAB (OUTPATIENT)
Dept: LAB | Facility: HOSPITAL | Age: 67
End: 2022-12-16

## 2022-12-16 DIAGNOSIS — E78.2 MIXED HYPERLIPIDEMIA: ICD-10-CM

## 2022-12-16 LAB
ALBUMIN SERPL-MCNC: 4 G/DL (ref 3.5–5.2)
ALBUMIN/GLOB SERPL: 1.5 G/DL
ALP SERPL-CCNC: 91 U/L (ref 39–117)
ALT SERPL W P-5'-P-CCNC: 13 U/L (ref 1–33)
ANION GAP SERPL CALCULATED.3IONS-SCNC: 6 MMOL/L (ref 5–15)
AST SERPL-CCNC: 19 U/L (ref 1–32)
BILIRUB SERPL-MCNC: 0.2 MG/DL (ref 0–1.2)
BUN SERPL-MCNC: 17 MG/DL (ref 8–23)
BUN/CREAT SERPL: 21.5 (ref 7–25)
CALCIUM SPEC-SCNC: 9.3 MG/DL (ref 8.6–10.5)
CHLORIDE SERPL-SCNC: 107 MMOL/L (ref 98–107)
CHOLEST SERPL-MCNC: 236 MG/DL (ref 0–200)
CO2 SERPL-SCNC: 29 MMOL/L (ref 22–29)
CREAT SERPL-MCNC: 0.79 MG/DL (ref 0.57–1)
EGFRCR SERPLBLD CKD-EPI 2021: 82.1 ML/MIN/1.73
GLOBULIN UR ELPH-MCNC: 2.7 GM/DL
GLUCOSE SERPL-MCNC: 91 MG/DL (ref 65–99)
HDLC SERPL-MCNC: 71 MG/DL (ref 40–60)
LDLC SERPL CALC-MCNC: 144 MG/DL (ref 0–100)
LDLC/HDLC SERPL: 1.99 {RATIO}
POTASSIUM SERPL-SCNC: 4.5 MMOL/L (ref 3.5–5.2)
PROT SERPL-MCNC: 6.7 G/DL (ref 6–8.5)
SODIUM SERPL-SCNC: 142 MMOL/L (ref 136–145)
TRIGL SERPL-MCNC: 120 MG/DL (ref 0–150)
VLDLC SERPL-MCNC: 21 MG/DL (ref 5–40)

## 2022-12-16 PROCEDURE — 80053 COMPREHEN METABOLIC PANEL: CPT

## 2022-12-16 PROCEDURE — 80061 LIPID PANEL: CPT

## 2022-12-16 PROCEDURE — 36415 COLL VENOUS BLD VENIPUNCTURE: CPT

## 2023-07-13 ENCOUNTER — TELEPHONE (OUTPATIENT)
Dept: FAMILY MEDICINE CLINIC | Facility: CLINIC | Age: 68
End: 2023-07-13

## 2023-07-13 NOTE — TELEPHONE ENCOUNTER
Caller: Kym PLUMMER    Relationship: Self    Best call back number: [phone]     What is the best time to reach you: ANY TIME    Who are you requesting to speak with (clinical staff, provider,  specific staff member): CLINICAL STAFF    What was the call regarding: PATIENT WANTS TO KNOW IF SHE IS GOING TO HAVE TO WAIT UNTIL DECEMBER OF THIS YEAR TO GET HER ANNUAL LABS DONE AND BE COVERED BY INSURANCE SINCE SHE HAD THEM DONE LAST DECEMBER. SHE IS DUE ANYTIME AFTER 9/8/23 FOR HER AWV BUT SHE WOULD LIKE TO GET LABS DONE AT THE SAME TIME. SHE IS SCHEDULED FOR 10/24/23 CURRENTLY BUT IF SHE IS NOT ABLE TO GET LABS AT THAT TIME SHE WOULD LIKE TO WAIT UNTIL DECEMBER AND DO HER APPOINTMENT THEN AS WELL.    PLEASE ADVISE    Is it okay if the provider responds through [email or web address]: NO, PHONE CALL, OK TO LEAVE A VOICEMAIL

## 2025-02-20 RX ORDER — OXYCODONE AND ACETAMINOPHEN 7.5; 325 MG/1; MG/1
1 TABLET ORAL EVERY 6 HOURS PRN
COMMUNITY

## 2025-02-20 NOTE — DISCHARGE INSTRUCTIONS
POST OPERATIVE INSTRUCTIONS  LACRIMAL SURGERY      Most procedures are performed with general anesthesia.  After surgery, you may start your diet by drinking clear liquids (e.g., 7-Up, Gatorade, ginger ale, etc.).  If clear liquids are tolerated well without nausea or vomiting, you may advance towards a regular diet.  Avoid “fatty foods” (eg, milk, pizza, hamburgers, etc.) on the day of surgery or as long as nausea persists.    Many patients receive a prescription for a pain reliever containing both Tylenol and a narcotic.  You should use the pain reliever as needed.  As post-operative pain lessens, you may switch to Extra Strength Tylenol.  You should not take Tylenol and the prescription pain reliever at the same time.  Unless specifically instructed, aspirin products such as David, Bufferin, Anacin, Excedrin, etc., should be avoided for at least one week after surgery.  This also includes Advil, Nuprin, Motrin and Ibuprofen.  Any other medications, which you were taking prior to surgery, should be continued on their regular schedule.    Whenever lying down or sleeping, keep your head elevated on 2 or 3 pillows such that your head is always elevated above the level of your chest.      Absolutely no lifting, bending or straining.    Apply cold compress to surgical site as much as possible for 2 to 3 days following surgery.  A folded washcloth soaked in ice-cold water and wrung out works well for this.  A bag of frozen peas also works well as it is lightweight but molds to the eye. Do not apply ice directly to the skin. A small tube of eye ointment should be provided after surgery. This is to be gently applied to the stitches twice daily.    Avoid placing a patch on the eye if possible. A patch is usually not necessary and may delay recognition of potential complications.   Your surgery involved improving the tear drainage from the eye to the nose. As such, part of your procedure was performed inside the nose.  Some bleeding from the nose is expected in the post-operative period. Usually it is milk and requires no treatment. Keeping the head elevated will help decrease this symptom. Afrin nasal spray may offer temporary benefit but should not be used more than 3 days.     Because your surgery involved creating a better passageway for tears to drain in the nose, you CANNOT BLOW YOUR NOSE in the post-operative period.  Often, a clear silicone tube is placed in the corner of the eye as a surgical splint. DO NOT accidentally remove it by assuming it is mucous, suture, thread, tear bubble. Etc. The tube is usually removed in the office 6 to 8 weeks after surgery.      If your surgery was done on an outpatient basis, you should receive a phone call the day after surgery to check on your progress and to arrange for a post-operative clinic appointment. The first post-operative visit will be one to two weeks after surgery. A second office visit will be required for “Adorno tube” removal.     The following problems should be reported to the office as soon as possible:  Continuous, brisk bleeding.  Please note that some oozing or drainage is common following a surgical procedure.  Do not try to blow your nose. This will likely only create more bleeding.  Temperature (fever) over 101?F.  Excessive pain at surgical site not relieved by the pain medication, especially if associated with protrusion of the eyeball.  Sudden loss of vision. Please note that some blurriness is expected after surgery due to the ointment used around the eyes. All patients should be able to check their vision even with the eyelid swelling.   Double vision  If a problem should arise or you have a question, I can be reached at any time of the day or week by calling [phone].  I hope that your surgery experience with us is a positive one.  Please contact my office with any questions.  Sincerely,    MD Bo Reyes MD

## 2025-02-27 ENCOUNTER — HOSPITAL ENCOUNTER (OUTPATIENT)
Age: 70
Setting detail: HOSPITAL OUTPATIENT SURGERY
Discharge: HOME | End: 2025-02-27
Attending: OPHTHALMOLOGY | Admitting: OPHTHALMOLOGY
Payer: MEDICARE

## 2025-02-27 ENCOUNTER — ANESTHESIA (OUTPATIENT)
Age: 70
End: 2025-02-27
Payer: MEDICARE

## 2025-02-27 ENCOUNTER — ANESTHESIA EVENT (OUTPATIENT)
Age: 70
End: 2025-02-27
Payer: MEDICARE

## 2025-02-27 VITALS
SYSTOLIC BLOOD PRESSURE: 132 MMHG | OXYGEN SATURATION: 99 % | HEIGHT: 65 IN | TEMPERATURE: 97.7 F | HEART RATE: 60 BPM | DIASTOLIC BLOOD PRESSURE: 59 MMHG | WEIGHT: 138.6 LBS | RESPIRATION RATE: 16 BRPM | BODY MASS INDEX: 23.09 KG/M2

## 2025-02-27 PROCEDURE — 25010000002 LIDOCAINE-EPINEPHRINE (PF) 2 %-1:200000 SOLUTION 20 ML VIAL: Performed by: OPHTHALMOLOGY

## 2025-02-27 PROCEDURE — 25010000002 PROPOFOL 1000 MG/100ML EMULSION: Performed by: ANESTHESIOLOGY

## 2025-02-27 PROCEDURE — 25810000003 LACTATED RINGERS PER 1000 ML: Performed by: ANESTHESIOLOGY

## 2025-02-27 PROCEDURE — 25010000002 CEFAZOLIN PER 500 MG: Performed by: OPHTHALMOLOGY

## 2025-02-27 PROCEDURE — 25010000002 HYDROMORPHONE PER 4 MG: Performed by: ANESTHESIOLOGY

## 2025-02-27 PROCEDURE — 25010000002 FENTANYL CITRATE (PF) 50 MCG/ML SOLUTION: Performed by: ANESTHESIOLOGY

## 2025-02-27 PROCEDURE — 14060 TIS TRNFR E/N/E/L 10 SQ CM/<: CPT | Performed by: OPHTHALMOLOGY

## 2025-02-27 PROCEDURE — 67950 REVISION OF EYELID: CPT | Performed by: OPHTHALMOLOGY

## 2025-02-27 PROCEDURE — 68700 REPAIR TEAR DUCTS: CPT | Performed by: OPHTHALMOLOGY

## 2025-02-27 PROCEDURE — 25010000002 LIDOCAINE 2% SOLUTION: Performed by: ANESTHESIOLOGY

## 2025-02-27 PROCEDURE — 25010000002 DEXAMETHASONE PER 1 MG: Performed by: ANESTHESIOLOGY

## 2025-02-27 PROCEDURE — 25010000002 BUPIVACAINE (PF) 0.5 % SOLUTION 10 ML VIAL: Performed by: OPHTHALMOLOGY

## 2025-02-27 PROCEDURE — 25010000002 ONDANSETRON PER 1 MG: Performed by: ANESTHESIOLOGY

## 2025-02-27 PROCEDURE — A4263 PERMANENT TEAR DUCT PLUG: HCPCS | Performed by: OPHTHALMOLOGY

## 2025-02-27 RX ORDER — DIPHENHYDRAMINE HYDROCHLORIDE 50 MG/ML
12.5 INJECTION INTRAMUSCULAR; INTRAVENOUS
Status: DISCONTINUED | OUTPATIENT
Start: 2025-02-27 | End: 2025-02-27 | Stop reason: HOSPADM

## 2025-02-27 RX ORDER — DEXAMETHASONE SODIUM PHOSPHATE 4 MG/ML
INJECTION, SOLUTION INTRA-ARTICULAR; INTRALESIONAL; INTRAMUSCULAR; INTRAVENOUS; SOFT TISSUE AS NEEDED
Status: DISCONTINUED | OUTPATIENT
Start: 2025-02-27 | End: 2025-02-27 | Stop reason: SURG

## 2025-02-27 RX ORDER — PROMETHAZINE HYDROCHLORIDE 25 MG/1
25 SUPPOSITORY RECTAL ONCE AS NEEDED
Status: DISCONTINUED | OUTPATIENT
Start: 2025-02-27 | End: 2025-02-27 | Stop reason: HOSPADM

## 2025-02-27 RX ORDER — ERYTHROMYCIN 5 MG/G
OINTMENT OPHTHALMIC 2 TIMES DAILY
Qty: 3.5 G | Refills: 1 | Status: SHIPPED | OUTPATIENT
Start: 2025-02-27

## 2025-02-27 RX ORDER — PROMETHAZINE HYDROCHLORIDE 12.5 MG/1
25 TABLET ORAL ONCE AS NEEDED
Status: DISCONTINUED | OUTPATIENT
Start: 2025-02-27 | End: 2025-02-27 | Stop reason: HOSPADM

## 2025-02-27 RX ORDER — SODIUM CHLORIDE 0.9 % (FLUSH) 0.9 %
10 SYRINGE (ML) INJECTION EVERY 12 HOURS SCHEDULED
Status: DISCONTINUED | OUTPATIENT
Start: 2025-02-27 | End: 2025-02-27 | Stop reason: HOSPADM

## 2025-02-27 RX ORDER — PROPOFOL 10 MG/ML
INJECTION, EMULSION INTRAVENOUS AS NEEDED
Status: DISCONTINUED | OUTPATIENT
Start: 2025-02-27 | End: 2025-02-27 | Stop reason: SURG

## 2025-02-27 RX ORDER — LIDOCAINE HYDROCHLORIDE 20 MG/ML
INJECTION, SOLUTION INFILTRATION; PERINEURAL AS NEEDED
Status: DISCONTINUED | OUTPATIENT
Start: 2025-02-27 | End: 2025-02-27 | Stop reason: SURG

## 2025-02-27 RX ORDER — FLUMAZENIL 0.1 MG/ML
0.2 INJECTION INTRAVENOUS AS NEEDED
Status: DISCONTINUED | OUTPATIENT
Start: 2025-02-27 | End: 2025-02-27 | Stop reason: HOSPADM

## 2025-02-27 RX ORDER — SODIUM CHLORIDE 9 MG/ML
40 INJECTION, SOLUTION INTRAVENOUS AS NEEDED
Status: DISCONTINUED | OUTPATIENT
Start: 2025-02-27 | End: 2025-02-27 | Stop reason: HOSPADM

## 2025-02-27 RX ORDER — AMOXICILLIN 500 MG/1
500 CAPSULE ORAL 2 TIMES DAILY
COMMUNITY

## 2025-02-27 RX ORDER — DROPERIDOL 2.5 MG/ML
0.62 INJECTION, SOLUTION INTRAMUSCULAR; INTRAVENOUS ONCE AS NEEDED
Status: DISCONTINUED | OUTPATIENT
Start: 2025-02-27 | End: 2025-02-27 | Stop reason: HOSPADM

## 2025-02-27 RX ORDER — HYDRALAZINE HYDROCHLORIDE 20 MG/ML
5 INJECTION INTRAMUSCULAR; INTRAVENOUS
Status: DISCONTINUED | OUTPATIENT
Start: 2025-02-27 | End: 2025-02-27 | Stop reason: HOSPADM

## 2025-02-27 RX ORDER — ERYTHROMYCIN 5 MG/G
OINTMENT OPHTHALMIC AS NEEDED
Status: DISCONTINUED | OUTPATIENT
Start: 2025-02-27 | End: 2025-02-27 | Stop reason: HOSPADM

## 2025-02-27 RX ORDER — HYDROCODONE BITARTRATE AND ACETAMINOPHEN 5; 325 MG/1; MG/1
1 TABLET ORAL EVERY 6 HOURS PRN
Qty: 15 TABLET | Refills: 0 | Status: SHIPPED | OUTPATIENT
Start: 2025-02-27 | End: 2025-03-03

## 2025-02-27 RX ORDER — DIPHENHYDRAMINE HCL 25 MG
25 CAPSULE ORAL
Status: DISCONTINUED | OUTPATIENT
Start: 2025-02-27 | End: 2025-02-27 | Stop reason: HOSPADM

## 2025-02-27 RX ORDER — FENTANYL CITRATE 50 UG/ML
50 INJECTION, SOLUTION INTRAMUSCULAR; INTRAVENOUS
Status: DISCONTINUED | OUTPATIENT
Start: 2025-02-27 | End: 2025-02-27 | Stop reason: HOSPADM

## 2025-02-27 RX ORDER — NALOXONE HCL 0.4 MG/ML
0.2 VIAL (ML) INJECTION AS NEEDED
Status: DISCONTINUED | OUTPATIENT
Start: 2025-02-27 | End: 2025-02-27 | Stop reason: HOSPADM

## 2025-02-27 RX ORDER — OXYMETAZOLINE HYDROCHLORIDE 0.05 G/100ML
SPRAY NASAL AS NEEDED
Status: DISCONTINUED | OUTPATIENT
Start: 2025-02-27 | End: 2025-02-27 | Stop reason: HOSPADM

## 2025-02-27 RX ORDER — SODIUM CHLORIDE 0.9 % (FLUSH) 0.9 %
10 SYRINGE (ML) INJECTION AS NEEDED
Status: DISCONTINUED | OUTPATIENT
Start: 2025-02-27 | End: 2025-02-27 | Stop reason: HOSPADM

## 2025-02-27 RX ORDER — HYDROMORPHONE HYDROCHLORIDE 1 MG/ML
0.25 INJECTION, SOLUTION INTRAMUSCULAR; INTRAVENOUS; SUBCUTANEOUS
Status: DISCONTINUED | OUTPATIENT
Start: 2025-02-27 | End: 2025-02-27 | Stop reason: HOSPADM

## 2025-02-27 RX ORDER — OXYCODONE AND ACETAMINOPHEN 7.5; 325 MG/1; MG/1
1 TABLET ORAL EVERY 4 HOURS PRN
Status: DISCONTINUED | OUTPATIENT
Start: 2025-02-27 | End: 2025-02-27 | Stop reason: HOSPADM

## 2025-02-27 RX ORDER — FENTANYL CITRATE 50 UG/ML
INJECTION, SOLUTION INTRAMUSCULAR; INTRAVENOUS AS NEEDED
Status: DISCONTINUED | OUTPATIENT
Start: 2025-02-27 | End: 2025-02-27 | Stop reason: SURG

## 2025-02-27 RX ORDER — MIDAZOLAM HYDROCHLORIDE 1 MG/ML
0.5 INJECTION, SOLUTION INTRAMUSCULAR; INTRAVENOUS
Status: DISCONTINUED | OUTPATIENT
Start: 2025-02-27 | End: 2025-02-27 | Stop reason: HOSPADM

## 2025-02-27 RX ORDER — ACETAMINOPHEN 500 MG
500 TABLET ORAL EVERY 6 HOURS PRN
COMMUNITY

## 2025-02-27 RX ORDER — ONDANSETRON 2 MG/ML
INJECTION INTRAMUSCULAR; INTRAVENOUS AS NEEDED
Status: DISCONTINUED | OUTPATIENT
Start: 2025-02-27 | End: 2025-02-27 | Stop reason: SURG

## 2025-02-27 RX ORDER — SODIUM CHLORIDE, SODIUM LACTATE, POTASSIUM CHLORIDE, CALCIUM CHLORIDE 600; 310; 30; 20 MG/100ML; MG/100ML; MG/100ML; MG/100ML
9 INJECTION, SOLUTION INTRAVENOUS CONTINUOUS PRN
Status: DISCONTINUED | OUTPATIENT
Start: 2025-02-27 | End: 2025-02-27 | Stop reason: HOSPADM

## 2025-02-27 RX ORDER — LABETALOL HYDROCHLORIDE 5 MG/ML
5 INJECTION, SOLUTION INTRAVENOUS
Status: DISCONTINUED | OUTPATIENT
Start: 2025-02-27 | End: 2025-02-27 | Stop reason: HOSPADM

## 2025-02-27 RX ORDER — HYDROCODONE BITARTRATE AND ACETAMINOPHEN 5; 325 MG/1; MG/1
1 TABLET ORAL ONCE AS NEEDED
Status: COMPLETED | OUTPATIENT
Start: 2025-02-27 | End: 2025-02-27

## 2025-02-27 RX ORDER — DROPERIDOL 2.5 MG/ML
0.62 INJECTION, SOLUTION INTRAMUSCULAR; INTRAVENOUS
Status: DISCONTINUED | OUTPATIENT
Start: 2025-02-27 | End: 2025-02-27 | Stop reason: HOSPADM

## 2025-02-27 RX ORDER — ONDANSETRON 2 MG/ML
4 INJECTION INTRAMUSCULAR; INTRAVENOUS ONCE AS NEEDED
Status: DISCONTINUED | OUTPATIENT
Start: 2025-02-27 | End: 2025-02-27 | Stop reason: HOSPADM

## 2025-02-27 RX ADMIN — FENTANYL CITRATE 50 MCG: 50 INJECTION, SOLUTION INTRAMUSCULAR; INTRAVENOUS at 09:59

## 2025-02-27 RX ADMIN — HYDROCODONE BITARTRATE AND ACETAMINOPHEN 1 TABLET: 5; 325 TABLET ORAL at 11:54

## 2025-02-27 RX ADMIN — LIDOCAINE HYDROCHLORIDE 60 MG: 20 INJECTION, SOLUTION INFILTRATION; PERINEURAL at 09:49

## 2025-02-27 RX ADMIN — ONDANSETRON 4 MG: 2 INJECTION INTRAMUSCULAR; INTRAVENOUS at 09:56

## 2025-02-27 RX ADMIN — HYDROMORPHONE HYDROCHLORIDE 0.25 MG: 1 INJECTION, SOLUTION INTRAMUSCULAR; INTRAVENOUS; SUBCUTANEOUS at 12:17

## 2025-02-27 RX ADMIN — SODIUM CHLORIDE 2000 MG: 900 INJECTION INTRAVENOUS at 09:39

## 2025-02-27 RX ADMIN — PROPOFOL 100 MG: 10 INJECTION, EMULSION INTRAVENOUS at 09:49

## 2025-02-27 RX ADMIN — SODIUM CHLORIDE, SODIUM LACTATE, POTASSIUM CHLORIDE, AND CALCIUM CHLORIDE 9 ML/HR: .6; .31; .03; .02 INJECTION, SOLUTION INTRAVENOUS at 09:39

## 2025-02-27 RX ADMIN — DEXAMETHASONE SODIUM PHOSPHATE 8 MG: 4 INJECTION, SOLUTION INTRA-ARTICULAR; INTRALESIONAL; INTRAMUSCULAR; INTRAVENOUS; SOFT TISSUE at 09:56

## 2025-02-27 NOTE — H&P
History & Physical       Patient: Kym PLUMMER    Date of Admission: No admission date for patient encounter.    YOB: 1955    Medical Record Number: [id number]      Chief Complaints: mohs defect of eyelid      History of Present Illness: 69 y.o. female presents with left sided skin cancer removal on 2/26 with plans for repair with Dr. Booker today. No new meds/health problems since office visit      Allergies:   Allergies   Allergen Reactions    Latex Rash       Review of systems negative, except pertaining to the HPI    Medications:   Home Medications:  No current facility-administered medications on file prior to encounter.     Current Outpatient Medications on File Prior to Encounter   Medication Sig    cetirizine (zyrTEC) 10 MG tablet Take 1 tablet by mouth Daily.    Cholecalciferol (VITAMIN D3) 2000 units tablet Take 1 tablet by mouth Daily.    Cyanocobalamin (VITAMIN B 12 PO) Take 1 tablet by mouth 1 (One) Time.    Magnesium 100 MG capsule Take 1 tablet by mouth 1 (One) Time.    Nutritional Supplements (MENOPAUSE FORMULA PO) Pueraria Mirifica 150mg   1 po qd    oxyCODONE-acetaminophen (PERCOCET) 7.5-325 MG per tablet Take 1 tablet by mouth Every 6 (Six) Hours As Needed.    Brittani 500 MG capsule 2 po qd     Current Medications:  Scheduled Meds:  Continuous Infusions:No current facility-administered medications for this encounter.    PRN Meds:.    Past Medical History:   Diagnosis Date    Allergic     Arthritis     Colon polyp     2019 = TA    DEXA     2020= (0.4/ 0.0)    Hard of hearing     B/ L Aids    MAMMO     NEG= 2019/ 2020/ 2021/ 2022/ 2023    PAP     NEG = 2018        Past Surgical History:   Procedure Laterality Date    BREAST BIOPSY Right     Bx = NEG    CATARACT EXTRACTION, BILATERAL Bilateral     COLONOSCOPY      TA = 2019/ 2022, rech 2025       GSI    EXCISION BAKERS CYST KNEE Right     Behind the knee    HIP ARTHROSCOPY Right     INGUINAL HERNIA REPAIR      LAPAROSCOPIC ASSISTED  "VAGINAL HYSTERECTOMY SALPINGO OOPHORECTOMY      DUB        Social History     Occupational History    Not on file   Tobacco Use    Smoking status: Former     Current packs/day: 0.00     Average packs/day: 1 pack/day for 13.0 years (13.0 ttl pk-yrs)     Types: Cigarettes     Start date: 1971     Quit date: 1984     Years since quittin.1    Smokeless tobacco: Never   Vaping Use    Vaping status: Never Used   Substance and Sexual Activity    Alcohol use: Yes     Comment: occasionally    Drug use: No    Sexual activity: Defer      Social History     Social History Narrative    Not on file        Family History   Problem Relation Age of Onset    Heart disease Mother         CHF    Stroke Father     Parkinsonism Father     Heart disease Father     Hypertension Father     Hyperlipidemia Father     Cancer Father 62        Prostate Cancer    Breast cancer Sister 56    Cancer Sister 62         Neck Cancer @57/ Breast Cancer    Hyperlipidemia Sister     Heart disease Brother         CAD/ AFib    Alcohol abuse Brother         Hx    Diabetes Brother     Hypertension Brother     Cancer Brother         Lung Cancer    Heart disease Brother         CAD/ AFib    Breast cancer Maternal Grandmother 52    Cancer Maternal Grandmother         Breast Cancer    Cancer Maternal Grandfather     Breast cancer Paternal Cousin 40           Physical Exam   Ht 165.1 cm (65\")   Wt 63.5 kg (140 lb)   LMP 2017   BMI 23.30 kg/m²   Constitutional: Alert, cooperative, in no acute distress    Head: Normocephalic.   Eyes:   Left medial canthal, nose and lower eyelid defect involving the tear duct  Neck: Normal range of motion.   Cardiovascular: Normal rate.    Pulmonary/Chest: Effort normal.   Neurological: Alert.   Skin: Skin is warm.   Psychiatric: Normal mood and affect.       Assessment/Plan:  The patient voiced understanding of the risks, benefits, and alternative forms of treatment that were discussed and the patient consents " to proceed with left lower eyelid rotational flap, left canthoplasty, left plastic repair of canaliculus, possible full thickness skin graft.       Bo Booker MD

## 2025-02-27 NOTE — ANESTHESIA PROCEDURE NOTES
Airway  Urgency: elective    Date/Time: 2/27/2025 9:51 AM    General Information and Staff    Patient location during procedure: OR  Anesthesiologist: Cain Lopez MD    Indications and Patient Condition  Indications for airway management: airway protection    Preoxygenated: yes  Mask difficulty assessment: 1 - vent by mask    Final Airway Details  Final airway type: supraglottic airway      Successful airway: unique  Size 4     Number of attempts at approach: 1  Assessment: lips, teeth, and gum same as pre-op and atraumatic intubation

## 2025-02-27 NOTE — ANESTHESIA PREPROCEDURE EVALUATION
Anesthesia Evaluation     Patient summary reviewed and Nursing notes reviewed   NPO Solid Status: > 8 hours  NPO Liquid Status: > 2 hours           Airway   Mallampati: II  TM distance: >3 FB  Neck ROM: full  No difficulty expected  Dental - normal exam     Pulmonary - normal exam   (+) a smoker Former,  Cardiovascular - normal exam  Exercise tolerance: good (4-7 METS)        Neuro/Psych  GI/Hepatic/Renal/Endo      Musculoskeletal     Abdominal    Substance History      OB/GYN          Other   arthritis,                   Anesthesia Plan    ASA 2     general     intravenous induction     Anesthetic plan, risks, benefits, and alternatives have been provided, discussed and informed consent has been obtained with: patient.      CODE STATUS:

## 2025-02-27 NOTE — OP NOTE
Preoperative diagnosis: Left medial canthal, eyelid, and nasal skin cancer status post Mohs surgery  Postoperative diagnosis: same  Procedure: 1) Left upper eyelid rotational flap                     2) Left lower eyelid rotational flap                     3) Left medial canthopexy                     4) Left plastic repair of canaliculus with madera tube  Surgeon: Bo Booker MD who was present and scrubbed throughout all critical portions of the operation  Assistants: none  Anesthesia: General  EBL: less than 50cc  Specimens: * No orders in the log *     Description of the procedure:   The patient was taken to the operating room and placed on the table in the supine position, where anesthesia was induced. 2% lidocaine with epinephrine and 0.5% marcaine in a 1:1 fashion was injected over the surgical site, and the patient was prepped and draped in the usual manner for orbitofacial surgery.      Corneal protectors were placed in both eyes.      The patient was found to have a 3.5 x 2 cm defect of the left medial canthus and eyelid/nose region after Mohs removal of skin cancer.  The defect was found to involve the superior and inferior canaliculi.  The lacrimal system was probed through the inferior punctum and the cut end of the anterior canaliculus. The cut end of the superior canaliculus was identified, the cut end of the inferior canaliculus was identified.  A madera tube was passed through the proximal punctum and brought from the cut edge of the canaliculus.  Then we found the cut wedge of the canaliculus and advanced through the distal canal until a hard stop was palpated the tube was redirected and brought through into the nose.  In similar fashion the inferior system was intubated.  We retrieved the madera tube from the nares and tied the ends in three square knots and cut short to retract into the nose.       To repair the superiormost portion of the  defect, A gabellar rotational flap was incised with a 15 blade and the flap was undermined with iris scissors, and rotated laterally to fill the superior portion of the defect.  The flap was trimmed to the appropriate size and  the central portion of the flap was sutured to the underlying tissue bed with 6-0 silk suture which was left untied in anticipation of bolster placement to prevent central tenting of the graft.   The remainder of the flap was sutured in place with 5-0 vicryl suture deeply and 5-0 prolene suture superficially.  The glabellar defect was closed with 5-0 Vicryl sutures in a buried fashion and 5-0 Prolene sutures superficially.      To close the inferior remainder of the defect a lower eyelid rotational flap was fashioned.  In standard rhomboid flap fashion we cut the flap with extension over the nasal sidewall into the cheek tissue.  The flap was thinned to the appropriate thickness and rotated into position to cover the inferior defect.  The central portion of the flap was sutured to the underlying tissue bed with 5-0 Vicryl suture in a buried fashion. The perimeter of the flap was sutured with 5-0 prolene suture.    Lastly we turned our attention to the medial canthus which was still elevated and showing overt signs of postoperative telecanthus.  To prevent this we performed a canthoplasty.  A 5-0 vicryl suture was used to anchor cutaneously around the medial canthal tendon and brought through the cut edge of the lower eyelid and then tied.  Prior to cuttiing the suture it was directed around the cut edge of the superior lid and tied.  This helped assume correct position of the medial canthus and the canthoplasty was completed.       The corneal protectors were removed and antibiotic ophthalmic ointment was placed over the surgical site.      The patient was then awakened and taken from the operating room in good condition, having tolerated the procedure well. There were no complications, and  the estimated blood loss was less than 50 cc.

## 2025-02-27 NOTE — ANESTHESIA POSTPROCEDURE EVALUATION
"Patient: Kym PLUMMER    Procedure Summary       Date: 02/27/25 Room / Location: [facility] OR 02 / [facility] MAIN OR    Anesthesia Start: 0942 Anesthesia Stop: 1133    Procedure: LEFT UPPER AND LOWER ROTATIONAL FLAP, LEFT PLASTIC REPAIR OF CANALICULUS LEFT CANTHOPLASTY WITH FARIA TUBE PLACEMENT, (Left: Eye) Diagnosis:     Surgeons: Bo Booker MD Provider: Cain Lopez MD    Anesthesia Type: general ASA Status: 2            Anesthesia Type: general    Vitals  Vitals Value Taken Time   /65 02/27/25 1217   Temp 36.4 °C (97.5 °F) 02/27/25 1131   Pulse 59 02/27/25 1225   Resp 16 02/27/25 1215   SpO2 100 % 02/27/25 1225   Vitals shown include unfiled device data.        Post Anesthesia Care and Evaluation    Patient location during evaluation: PACU  Level of consciousness: awake  Pain management: adequate    Airway patency: patent  Anesthetic complications: No anesthetic complications  PONV Status: controlled  Cardiovascular status: acceptable  Respiratory status: acceptable  Hydration status: acceptable    Comments: /65   Pulse 63   Temp 36.4 °C (97.5 °F) (Temporal)   Resp 16   Ht 165.1 cm (65\")   Wt 62.9 kg (138 lb 9.6 oz)   LMP 11/05/2017   SpO2 99%   BMI 23.06 kg/m²       "